# Patient Record
Sex: FEMALE | Race: WHITE | Employment: OTHER | URBAN - METROPOLITAN AREA
[De-identification: names, ages, dates, MRNs, and addresses within clinical notes are randomized per-mention and may not be internally consistent; named-entity substitution may affect disease eponyms.]

---

## 2024-10-04 ENCOUNTER — HOSPITAL ENCOUNTER (OUTPATIENT)
Dept: PREADMISSION TESTING | Age: 74
Discharge: HOME | End: 2024-10-08
Payer: MEDICARE

## 2024-10-04 VITALS
DIASTOLIC BLOOD PRESSURE: 68 MMHG | WEIGHT: 184 LBS | TEMPERATURE: 96.8 F | SYSTOLIC BLOOD PRESSURE: 165 MMHG | HEIGHT: 63 IN | RESPIRATION RATE: 14 BRPM | OXYGEN SATURATION: 97 % | HEART RATE: 81 BPM | BODY MASS INDEX: 32.6 KG/M2

## 2024-10-04 LAB
ANION GAP SERPL CALCULATED.3IONS-SCNC: 10 MMOL/L (ref 9–17)
BUN SERPL-MCNC: 16 MG/DL (ref 8–23)
BUN/CREAT SERPL: 18 (ref 9–20)
CALCIUM SERPL-MCNC: 9.9 MG/DL (ref 8.6–10.4)
CHLORIDE SERPL-SCNC: 99 MMOL/L (ref 98–107)
CO2 SERPL-SCNC: 27 MMOL/L (ref 20–31)
CREAT SERPL-MCNC: 0.9 MG/DL (ref 0.5–0.9)
ERYTHROCYTE [DISTWIDTH] IN BLOOD BY AUTOMATED COUNT: 14.2 % (ref 11.8–14.4)
GFR, ESTIMATED: 67 ML/MIN/1.73M2
GLUCOSE SERPL-MCNC: 93 MG/DL (ref 70–99)
HCT VFR BLD AUTO: 36.9 % (ref 36.3–47.1)
HGB BLD-MCNC: 12.3 G/DL (ref 11.9–15.1)
MCH RBC QN AUTO: 32.4 PG (ref 25.2–33.5)
MCHC RBC AUTO-ENTMCNC: 33.3 G/DL (ref 28.4–34.8)
MCV RBC AUTO: 97.1 FL (ref 82.6–102.9)
NRBC BLD-RTO: 0 PER 100 WBC
PLATELET # BLD AUTO: 247 K/UL (ref 138–453)
PMV BLD AUTO: 9.3 FL (ref 8.1–13.5)
POTASSIUM SERPL-SCNC: 4.3 MMOL/L (ref 3.7–5.3)
RBC # BLD AUTO: 3.8 M/UL (ref 3.95–5.11)
SODIUM SERPL-SCNC: 136 MMOL/L (ref 135–144)
WBC OTHER # BLD: 6.3 K/UL (ref 3.5–11.3)

## 2024-10-04 PROCEDURE — 36415 COLL VENOUS BLD VENIPUNCTURE: CPT

## 2024-10-04 PROCEDURE — 85027 COMPLETE CBC AUTOMATED: CPT

## 2024-10-04 PROCEDURE — 80048 BASIC METABOLIC PNL TOTAL CA: CPT

## 2024-10-04 RX ORDER — AMLODIPINE BESYLATE 10 MG/1
10 TABLET ORAL DAILY
COMMUNITY
Start: 2024-05-15

## 2024-10-04 RX ORDER — OMEGA-3S/DHA/EPA/FISH OIL/D3 300MG-1000
400 CAPSULE ORAL DAILY
COMMUNITY

## 2024-10-04 RX ORDER — ALLOPURINOL 100 MG/1
50 TABLET ORAL DAILY
COMMUNITY
Start: 2023-10-16

## 2024-10-04 RX ORDER — LISINOPRIL 40 MG/1
20 TABLET ORAL DAILY
COMMUNITY
Start: 2024-01-30

## 2024-10-04 RX ORDER — ALENDRONATE SODIUM 70 MG/1
70 TABLET ORAL
COMMUNITY
Start: 2024-04-08

## 2024-10-04 RX ORDER — POTASSIUM CHLORIDE 750 MG/1
10 TABLET, EXTENDED RELEASE ORAL DAILY
COMMUNITY
Start: 2024-09-06 | End: 2024-10-06

## 2024-10-04 RX ORDER — CYCLOBENZAPRINE HCL 10 MG
10 TABLET ORAL 3 TIMES DAILY PRN
COMMUNITY
Start: 2024-01-30

## 2024-10-04 RX ORDER — PRAVASTATIN SODIUM 80 MG/1
80 TABLET ORAL DAILY
COMMUNITY

## 2024-10-04 RX ORDER — CHLORAL HYDRATE 500 MG
1 CAPSULE ORAL DAILY
COMMUNITY

## 2024-10-04 RX ORDER — VITAMIN B COMPLEX
1 CAPSULE ORAL DAILY
COMMUNITY

## 2024-10-04 RX ORDER — CLONIDINE 0.2 MG/24H
1 PATCH, EXTENDED RELEASE TRANSDERMAL WEEKLY
COMMUNITY
Start: 2024-04-22

## 2024-10-04 RX ORDER — CETIRIZINE HYDROCHLORIDE 10 MG/1
10 TABLET ORAL DAILY
COMMUNITY

## 2024-10-04 RX ORDER — UBIDECARENONE 30 MG
100 CAPSULE ORAL DAILY
COMMUNITY

## 2024-10-04 RX ORDER — BIOTIN 10000 MCG
1 CAPSULE ORAL DAILY
COMMUNITY

## 2024-10-04 RX ORDER — DOXAZOSIN 2 MG/1
2 TABLET ORAL NIGHTLY
COMMUNITY
Start: 2024-08-30

## 2024-10-04 NOTE — H&P
for increase in thirst, increase in urination, and heat or cold intolerance.  MUSCULOSKELETAL: See HPI   NEUROLOGICAL: Negative for headaches, dizziness, lightheadedness, numbness, and tingling extremities.  BEHAVIOR/PSYCH: Negative for depression and anxiety.    Physical Exam:   BP (!) 165/68   Pulse 81   Temp 96.8 °F (36 °C) (Infrared)   Resp 14   Ht 1.6 m (5' 3\")   Wt 83.5 kg (184 lb)   SpO2 97%   BMI 32.59 kg/m²   No LMP recorded. Patient is postmenopausal.  No obstetric history on file.  No results for input(s): \"POCGLU\" in the last 72 hours.     General Appearance:  Obese Alert, well appearing, and in no acute distress.   Mental status: Oriented to person, place, and time.  Head: Normocephalic and atraumatic.  Eye: Wearing glasses No icterus, redness, pupils equal and reactive, extraocular eye movements intact, and conjunctiva clear.  Ear: Hearing grossly intact.  Nose: No drainage noted.  Mouth: Mucous membranes moist.  Neck: Supple and no carotid bruits noted.  Lungs: Bilateral equal air entry, clear to auscultation, no wheezing, rales or rhonchi, and normal effort.  Cardiovascular: Wearing event monitor. Normal rate, regular rhythm, no murmur, gallop, or rub.  Abdomen: Soft, nontender, nondistended, and active bowel sounds.  Neurologic: Normal speech and cranial nerves II through XII grossly intact. Strength 5/5 bilaterally.  Skin: No gross lesions, rashes, bruising, or bleeding on exposed skin area.  Extremities: Posterior tibial pulses 2+ bilaterally. No pedal edema.  No calf tenderness with palpation. Dorsiflexion and plantar flexion 5/5 bilaterally.   Psych: Normal affect.     Investigations:      Laboratory Testing:  Recent Results (from the past 24 hour(s))   Basic Metabolic Panel    Collection Time: 10/04/24 11:15 AM   Result Value Ref Range    Sodium 136 135 - 144 mmol/L    Potassium 4.3 3.7 - 5.3 mmol/L    Chloride 99 98 - 107 mmol/L    CO2 27 20 - 31 mmol/L    Anion Gap 10 9 - 17 mmol/L

## 2024-10-04 NOTE — PRE-PROCEDURE INSTRUCTIONS
On the Day of Your Surgery, Friday, 10/25/24, Please Arrive At 0545 AM     Enter the hospital through the Main Entrance, take the lobby elevators to the second floor and check in at the Surgery Registration desk.    You may bring a copy of living will and/or POA paperwork to registration on the day of surgery to be scanned.     Continue to take your home medications as you normally do up to and including the night before surgery with the exception of blood thinning medications.    Blood Thinning Medications:  Please stop prescription blood thinning medications such as Apixaban (Eliquis); Clopidogrel (Plavix); Dabigatran (Pradaxa); Prasugrel (Effient); Rivaroxaban (Xarelto); Ticagrelor (Brilinta); Warfarin (Coumadin) only as directed by your surgeon and/or the prescribing physician    Some common examples of other medications that can thin your blood are: Aspirin, Ibuprofen (Advil, Motrin), Naproxen (Aleve), Meloxicam (Mobic), Celecoxib (Celebrex), Fish Oil, many Herbal Supplements.  These medications should usually be stopped at least 7 days prior to surgery.    Co Q10, fish oil, biotin.    Tylenol is OK to take for pain the week prior to surgery.    Failure to stop certain medications may interfere with your scheduled surgery.    If you receive instructions from your surgeon regarding what medications to stop prior to surgery, please follow those specific instructions.    Please take the following medication(s) the day of surgery with small sips of water:              Doxazosin, amlodipine.    If you are on medicare and there is a possibility that you will be admitted following surgery:   Please bring your prescription medications in their original bottles with pharmacy label on the day of surgery.    Showering Before Surgery:     You can play an important role in your own health by carefully washing before surgery. Shower the night before and the morning of surgery using the instructions below. If you are allergic  listed above.  No smoking or chewing tobacco after midnight.  No alcoholic beverages for 24 hours prior to surgery.  2. You may brush your teeth but do not swallow the water.  3. If you wear glasses bring a case for them if you have one.  No contacts should be worn the day of surgery.  You may also bring your hearing aids. If you have dentures, most surgical procedures involving anesthesia will require that you remove them prior to surgery.  4. If you sleep with a CPAP or BiPAP machine at home and plan on staying in the hospital overnight after surgery, please bring your machine with you.   5. Do not wear any jewelry or body piercings the day of surgery.  No nail polish on the operative extremity (arm/hand or leg/foot surgeries)   6. If you are staying overnight with us, you may bring a small bag of necessary personal items.   7. Please wear loose, comfortable clothing.  If you are potentially going to have a cast, sling, brace or bulky dressing, make sure to wear clothing that will fit over it.   8. In case of illness - If you have cold or flu like symptoms (high fever, runny nose, sore throat, cough, etc.) rash, nausea, vomiting, loose stools, and/or recent contact with someone who has a contagious disease (chicken pox, measles, COVID-19, etc.).  Please call your surgeon before coming to the hospital.    Transportation After Your Surgery/Procedure:     If you are going home the same day of surgery you need someone to drive you home.  Your  must be at least 18 years of age.  A taxi cab or other nonmedical public transportation is not acceptable unless you have someone to ride home in the vehicle with you.   For your safety, someone must remain with you for the first 24 hours after your surgery if you receive anesthesia or medication.  If you do not have someone to stay with you, your procedure may be cancelled.  As a patient at [facility] you can expect quality medical and nursing care that is

## 2024-10-07 ENCOUNTER — ANESTHESIA EVENT (OUTPATIENT)
Dept: OPERATING ROOM | Age: 74
End: 2024-10-07
Payer: MEDICARE

## 2024-10-25 ENCOUNTER — ANESTHESIA (OUTPATIENT)
Dept: OPERATING ROOM | Age: 74
End: 2024-10-25
Payer: MEDICARE

## 2024-10-25 ENCOUNTER — HOSPITAL ENCOUNTER (INPATIENT)
Age: 74
LOS: 3 days | Discharge: NURSING, REHAB OR LONG-TERM CARE | DRG: 493 | End: 2024-10-28
Attending: PODIATRIST | Admitting: PODIATRIST
Payer: MEDICARE

## 2024-10-25 ENCOUNTER — APPOINTMENT (OUTPATIENT)
Dept: GENERAL RADIOLOGY | Age: 74
DRG: 493 | End: 2024-10-25
Attending: PODIATRIST
Payer: MEDICARE

## 2024-10-25 DIAGNOSIS — G89.18 POST-OP PAIN: Primary | ICD-10-CM

## 2024-10-25 LAB
GLUCOSE BLD-MCNC: 137 MG/DL (ref 65–105)
GLUCOSE BLD-MCNC: 206 MG/DL (ref 65–105)
GLUCOSE BLD-MCNC: 240 MG/DL (ref 65–105)
HCT VFR BLD AUTO: 35.7 % (ref 36.3–47.1)
HGB BLD-MCNC: 11.1 G/DL (ref 11.9–15.1)

## 2024-10-25 PROCEDURE — 73650 X-RAY EXAM OF HEEL: CPT

## 2024-10-25 PROCEDURE — 64447 NJX AA&/STRD FEMORAL NRV IMG: CPT | Performed by: ANESTHESIOLOGY

## 2024-10-25 PROCEDURE — 6370000000 HC RX 637 (ALT 250 FOR IP)

## 2024-10-25 PROCEDURE — 2580000003 HC RX 258: Performed by: NURSE ANESTHETIST, CERTIFIED REGISTERED

## 2024-10-25 PROCEDURE — 36415 COLL VENOUS BLD VENIPUNCTURE: CPT

## 2024-10-25 PROCEDURE — 3600000002 HC SURGERY LEVEL 2 BASE: Performed by: PODIATRIST

## 2024-10-25 PROCEDURE — 6360000002 HC RX W HCPCS: Performed by: ANESTHESIOLOGY

## 2024-10-25 PROCEDURE — 73630 X-RAY EXAM OF FOOT: CPT

## 2024-10-25 PROCEDURE — 7100000001 HC PACU RECOVERY - ADDTL 15 MIN: Performed by: PODIATRIST

## 2024-10-25 PROCEDURE — 2709999900 HC NON-CHARGEABLE SUPPLY: Performed by: PODIATRIST

## 2024-10-25 PROCEDURE — 0SGJ07Z FUSION OF LEFT TARSAL JOINT WITH AUTOLOGOUS TISSUE SUBSTITUTE, OPEN APPROACH: ICD-10-PCS | Performed by: PODIATRIST

## 2024-10-25 PROCEDURE — 73610 X-RAY EXAM OF ANKLE: CPT

## 2024-10-25 PROCEDURE — 85014 HEMATOCRIT: CPT

## 2024-10-25 PROCEDURE — 1200000000 HC SEMI PRIVATE

## 2024-10-25 PROCEDURE — 2500000003 HC RX 250 WO HCPCS: Performed by: PODIATRIST

## 2024-10-25 PROCEDURE — 73590 X-RAY EXAM OF LOWER LEG: CPT

## 2024-10-25 PROCEDURE — 6360000002 HC RX W HCPCS

## 2024-10-25 PROCEDURE — 2580000003 HC RX 258: Performed by: ANESTHESIOLOGY

## 2024-10-25 PROCEDURE — 6370000000 HC RX 637 (ALT 250 FOR IP): Performed by: INTERNAL MEDICINE

## 2024-10-25 PROCEDURE — C1713 ANCHOR/SCREW BN/BN,TIS/BN: HCPCS | Performed by: PODIATRIST

## 2024-10-25 PROCEDURE — 3600000012 HC SURGERY LEVEL 2 ADDTL 15MIN: Performed by: PODIATRIST

## 2024-10-25 PROCEDURE — 82947 ASSAY GLUCOSE BLOOD QUANT: CPT

## 2024-10-25 PROCEDURE — 2500000003 HC RX 250 WO HCPCS: Performed by: NURSE ANESTHETIST, CERTIFIED REGISTERED

## 2024-10-25 PROCEDURE — 6360000002 HC RX W HCPCS: Performed by: PODIATRIST

## 2024-10-25 PROCEDURE — 99222 1ST HOSP IP/OBS MODERATE 55: CPT | Performed by: INTERNAL MEDICINE

## 2024-10-25 PROCEDURE — 85018 HEMOGLOBIN: CPT

## 2024-10-25 PROCEDURE — 7100000000 HC PACU RECOVERY - FIRST 15 MIN: Performed by: PODIATRIST

## 2024-10-25 PROCEDURE — 3700000001 HC ADD 15 MINUTES (ANESTHESIA): Performed by: PODIATRIST

## 2024-10-25 PROCEDURE — 2720000010 HC SURG SUPPLY STERILE: Performed by: PODIATRIST

## 2024-10-25 PROCEDURE — 0SGG0KZ FUSION OF LEFT ANKLE JOINT WITH NONAUTOLOGOUS TISSUE SUBSTITUTE, OPEN APPROACH: ICD-10-PCS | Performed by: PODIATRIST

## 2024-10-25 PROCEDURE — 3700000000 HC ANESTHESIA ATTENDED CARE: Performed by: PODIATRIST

## 2024-10-25 PROCEDURE — 2580000003 HC RX 258

## 2024-10-25 PROCEDURE — 6360000002 HC RX W HCPCS: Performed by: NURSE ANESTHETIST, CERTIFIED REGISTERED

## 2024-10-25 PROCEDURE — 3E0T3BZ INTRODUCTION OF ANESTHETIC AGENT INTO PERIPHERAL NERVES AND PLEXI, PERCUTANEOUS APPROACH: ICD-10-PCS | Performed by: ANESTHESIOLOGY

## 2024-10-25 DEVICE — 3.5 X 28 MM R3CON NON-LOCKING PLATE SCREW
Type: IMPLANTABLE DEVICE | Site: FOOT | Status: FUNCTIONAL
Brand: GORILLA PLATING SYSTEM

## 2024-10-25 DEVICE — 3.5 X 24 MM R3CON NON-LOCKING PLATE SCREW
Type: IMPLANTABLE DEVICE | Site: FOOT | Status: FUNCTIONAL
Brand: GORILLA PLATING SYSTEM

## 2024-10-25 DEVICE — MONSTER 5.5 X 44MM HEADLESS SCREW, SHORT THREAD
Type: IMPLANTABLE DEVICE | Site: FOOT | Status: FUNCTIONAL
Brand: MONSTER SCREW SYSTEM

## 2024-10-25 DEVICE — 3.5 X 16 MM R3CON LOCKING PLATE SCREW
Type: IMPLANTABLE DEVICE | Site: FOOT | Status: FUNCTIONAL
Brand: GORILLA PLATING SYSTEM

## 2024-10-25 DEVICE — PHANTOM NAIL, CROSSING SCREW, Ø5.0MM X 26MM LENGTH, HEADED
Type: IMPLANTABLE DEVICE | Site: FOOT | Status: FUNCTIONAL
Brand: PHANTOM HINDFOOT TTC/TC NAIL SYSTEM

## 2024-10-25 DEVICE — GORILLA, PLATE, MED COL ARCH NC-1ST MET, 2.0MM THK, RIGHT, LG, TIA
Type: IMPLANTABLE DEVICE | Site: FOOT | Status: FUNCTIONAL
Brand: GORILLA PLATING SYSTEM

## 2024-10-25 DEVICE — PHANTOM NAIL, CALCANEUS SCREW, Ø7.2MM X 70MM LENGTH, HEADLESS
Type: IMPLANTABLE DEVICE | Site: HEEL | Status: FUNCTIONAL
Brand: PHANTOM HINDFOOT TTC/TC NAIL SYSTEM

## 2024-10-25 DEVICE — 3.5 X 24 MM R3CON LOCKING PLATE SCREW
Type: IMPLANTABLE DEVICE | Site: FOOT | Status: FUNCTIONAL
Brand: GORILLA PLATING SYSTEM

## 2024-10-25 DEVICE — PHANTOM ACTIVCORE NAIL, 10.0 X 250MM STERILE PACKAGED
Type: IMPLANTABLE DEVICE | Site: ANKLE | Status: FUNCTIONAL
Brand: PHANTOM HINDFOOT TTC/TC NAIL SYSTEM

## 2024-10-25 DEVICE — 3.5 X 46 MM R3CON NON-LOCKING PLATE SCREW
Type: IMPLANTABLE DEVICE | Site: FOOT | Status: FUNCTIONAL
Brand: GORILLA PLATING SYSTEM

## 2024-10-25 DEVICE — MONSTER 5.5 X 54MM HEADLESS SCREW, SHORT THREAD
Type: IMPLANTABLE DEVICE | Site: FOOT | Status: FUNCTIONAL
Brand: MONSTER SCREW SYSTEM

## 2024-10-25 DEVICE — GRAFT BNE 5 CC CELLULAR BNE MTRX V92 FC+: Type: IMPLANTABLE DEVICE | Site: ANKLE | Status: FUNCTIONAL

## 2024-10-25 DEVICE — IMPLANTABLE DEVICE: Type: IMPLANTABLE DEVICE | Site: HEEL | Status: FUNCTIONAL

## 2024-10-25 DEVICE — 3.5 X 18 MM R3CON LOCKING PLATE SCREW
Type: IMPLANTABLE DEVICE | Site: FOOT | Status: FUNCTIONAL
Brand: GORILLA PLATING SYSTEM

## 2024-10-25 RX ORDER — OXYCODONE HYDROCHLORIDE 5 MG/1
5 TABLET ORAL
Status: DISCONTINUED | OUTPATIENT
Start: 2024-10-25 | End: 2024-10-25 | Stop reason: HOSPADM

## 2024-10-25 RX ORDER — CEFAZOLIN SODIUM 1 G/3ML
INJECTION, POWDER, FOR SOLUTION INTRAMUSCULAR; INTRAVENOUS
Status: DISCONTINUED | OUTPATIENT
Start: 2024-10-25 | End: 2024-10-25 | Stop reason: SDUPTHER

## 2024-10-25 RX ORDER — SODIUM CHLORIDE 9 MG/ML
INJECTION, SOLUTION INTRAVENOUS PRN
Status: DISCONTINUED | OUTPATIENT
Start: 2024-10-25 | End: 2024-10-25 | Stop reason: HOSPADM

## 2024-10-25 RX ORDER — SODIUM CHLORIDE 0.9 % (FLUSH) 0.9 %
5-40 SYRINGE (ML) INJECTION EVERY 12 HOURS SCHEDULED
Status: DISCONTINUED | OUTPATIENT
Start: 2024-10-25 | End: 2024-10-28 | Stop reason: HOSPADM

## 2024-10-25 RX ORDER — VITAMIN B COMPLEX
1 CAPSULE ORAL DAILY
Status: DISCONTINUED | OUTPATIENT
Start: 2024-10-25 | End: 2024-10-25 | Stop reason: CLARIF

## 2024-10-25 RX ORDER — POTASSIUM CHLORIDE 1500 MG/1
40 TABLET, EXTENDED RELEASE ORAL PRN
Status: ACTIVE | OUTPATIENT
Start: 2024-10-25 | End: 2024-10-28

## 2024-10-25 RX ORDER — DIPHENHYDRAMINE HYDROCHLORIDE 50 MG/ML
12.5 INJECTION INTRAMUSCULAR; INTRAVENOUS
Status: DISCONTINUED | OUTPATIENT
Start: 2024-10-25 | End: 2024-10-25 | Stop reason: HOSPADM

## 2024-10-25 RX ORDER — ROPIVACAINE HYDROCHLORIDE 5 MG/ML
INJECTION, SOLUTION EPIDURAL; INFILTRATION; PERINEURAL
Status: COMPLETED | OUTPATIENT
Start: 2024-10-25 | End: 2024-10-25

## 2024-10-25 RX ORDER — EPHEDRINE SULFATE/0.9% NACL/PF 50 MG/5 ML
SYRINGE (ML) INTRAVENOUS
Status: DISCONTINUED | OUTPATIENT
Start: 2024-10-25 | End: 2024-10-25

## 2024-10-25 RX ORDER — UBIDECARENONE 30 MG
100 CAPSULE ORAL DAILY
Status: DISCONTINUED | OUTPATIENT
Start: 2024-10-25 | End: 2024-10-25 | Stop reason: CLARIF

## 2024-10-25 RX ORDER — FENTANYL CITRATE 50 UG/ML
50 INJECTION, SOLUTION INTRAMUSCULAR; INTRAVENOUS EVERY 5 MIN PRN
Status: DISCONTINUED | OUTPATIENT
Start: 2024-10-25 | End: 2024-10-25 | Stop reason: HOSPADM

## 2024-10-25 RX ORDER — EPHEDRINE SULFATE/0.9% NACL/PF 25 MG/5 ML
SYRINGE (ML) INTRAVENOUS
Status: DISCONTINUED | OUTPATIENT
Start: 2024-10-25 | End: 2024-10-25 | Stop reason: SDUPTHER

## 2024-10-25 RX ORDER — VITAMIN B COMPLEX
500 TABLET ORAL DAILY
Status: DISCONTINUED | OUTPATIENT
Start: 2024-10-26 | End: 2024-10-28 | Stop reason: HOSPADM

## 2024-10-25 RX ORDER — SODIUM CHLORIDE 9 MG/ML
INJECTION, SOLUTION INTRAVENOUS
Status: DISCONTINUED | OUTPATIENT
Start: 2024-10-25 | End: 2024-10-25 | Stop reason: SDUPTHER

## 2024-10-25 RX ORDER — CETIRIZINE HYDROCHLORIDE 10 MG/1
10 TABLET ORAL DAILY
Status: DISCONTINUED | OUTPATIENT
Start: 2024-10-25 | End: 2024-10-28 | Stop reason: HOSPADM

## 2024-10-25 RX ORDER — POLYETHYLENE GLYCOL 3350 17 G/17G
17 POWDER, FOR SOLUTION ORAL DAILY PRN
Status: DISCONTINUED | OUTPATIENT
Start: 2024-10-25 | End: 2024-10-28 | Stop reason: HOSPADM

## 2024-10-25 RX ORDER — ALLOPURINOL 100 MG/1
50 TABLET ORAL DAILY
Status: DISCONTINUED | OUTPATIENT
Start: 2024-10-25 | End: 2024-10-28 | Stop reason: HOSPADM

## 2024-10-25 RX ORDER — PROCHLORPERAZINE EDISYLATE 5 MG/ML
5 INJECTION INTRAMUSCULAR; INTRAVENOUS
Status: DISCONTINUED | OUTPATIENT
Start: 2024-10-25 | End: 2024-10-25 | Stop reason: HOSPADM

## 2024-10-25 RX ORDER — POTASSIUM CHLORIDE 1500 MG/1
10 TABLET, EXTENDED RELEASE ORAL DAILY
Status: DISCONTINUED | OUTPATIENT
Start: 2024-10-26 | End: 2024-10-28 | Stop reason: HOSPADM

## 2024-10-25 RX ORDER — HYDROMORPHONE HYDROCHLORIDE 1 MG/ML
0.5 INJECTION, SOLUTION INTRAMUSCULAR; INTRAVENOUS; SUBCUTANEOUS EVERY 4 HOURS PRN
Status: DISCONTINUED | OUTPATIENT
Start: 2024-10-25 | End: 2024-10-28 | Stop reason: HOSPADM

## 2024-10-25 RX ORDER — SODIUM CHLORIDE 0.9 % (FLUSH) 0.9 %
5-40 SYRINGE (ML) INJECTION PRN
Status: DISCONTINUED | OUTPATIENT
Start: 2024-10-25 | End: 2024-10-28 | Stop reason: HOSPADM

## 2024-10-25 RX ORDER — FENTANYL CITRATE 50 UG/ML
INJECTION, SOLUTION INTRAMUSCULAR; INTRAVENOUS
Status: DISCONTINUED | OUTPATIENT
Start: 2024-10-25 | End: 2024-10-25 | Stop reason: SDUPTHER

## 2024-10-25 RX ORDER — PRAVASTATIN SODIUM 40 MG
80 TABLET ORAL DAILY
Status: DISCONTINUED | OUTPATIENT
Start: 2024-10-25 | End: 2024-10-28 | Stop reason: HOSPADM

## 2024-10-25 RX ORDER — ONDANSETRON 2 MG/ML
4 INJECTION INTRAMUSCULAR; INTRAVENOUS EVERY 6 HOURS PRN
Status: DISCONTINUED | OUTPATIENT
Start: 2024-10-25 | End: 2024-10-28 | Stop reason: HOSPADM

## 2024-10-25 RX ORDER — LANOLIN ALCOHOL/MO/W.PET/CERES
400 CREAM (GRAM) TOPICAL DAILY
Status: DISCONTINUED | OUTPATIENT
Start: 2024-10-26 | End: 2024-10-28 | Stop reason: HOSPADM

## 2024-10-25 RX ORDER — METOPROLOL TARTRATE 1 MG/ML
5 INJECTION, SOLUTION INTRAVENOUS EVERY 6 HOURS PRN
Status: DISCONTINUED | OUTPATIENT
Start: 2024-10-25 | End: 2024-10-28 | Stop reason: HOSPADM

## 2024-10-25 RX ORDER — ENOXAPARIN SODIUM 100 MG/ML
40 INJECTION SUBCUTANEOUS EVERY 24 HOURS
Status: DISCONTINUED | OUTPATIENT
Start: 2024-10-25 | End: 2024-10-28 | Stop reason: HOSPADM

## 2024-10-25 RX ORDER — ROCURONIUM BROMIDE 10 MG/ML
INJECTION, SOLUTION INTRAVENOUS
Status: DISCONTINUED | OUTPATIENT
Start: 2024-10-25 | End: 2024-10-25 | Stop reason: SDUPTHER

## 2024-10-25 RX ORDER — SODIUM CHLORIDE 0.9 % (FLUSH) 0.9 %
5-40 SYRINGE (ML) INJECTION PRN
Status: DISCONTINUED | OUTPATIENT
Start: 2024-10-25 | End: 2024-10-25 | Stop reason: HOSPADM

## 2024-10-25 RX ORDER — LIDOCAINE HYDROCHLORIDE 20 MG/ML
INJECTION, SOLUTION EPIDURAL; INFILTRATION; INTRACAUDAL; PERINEURAL
Status: DISCONTINUED | OUTPATIENT
Start: 2024-10-25 | End: 2024-10-25 | Stop reason: SDUPTHER

## 2024-10-25 RX ORDER — DEXAMETHASONE SODIUM PHOSPHATE 10 MG/ML
INJECTION, SOLUTION INTRAMUSCULAR; INTRAVENOUS
Status: DISCONTINUED | OUTPATIENT
Start: 2024-10-25 | End: 2024-10-25 | Stop reason: SDUPTHER

## 2024-10-25 RX ORDER — DOXAZOSIN 1 MG/1
2 TABLET ORAL NIGHTLY
Status: DISCONTINUED | OUTPATIENT
Start: 2024-10-25 | End: 2024-10-28 | Stop reason: HOSPADM

## 2024-10-25 RX ORDER — SODIUM CHLORIDE 9 MG/ML
INJECTION, SOLUTION INTRAVENOUS PRN
Status: DISCONTINUED | OUTPATIENT
Start: 2024-10-25 | End: 2024-10-28 | Stop reason: HOSPADM

## 2024-10-25 RX ORDER — DEXTROSE, SODIUM CHLORIDE, SODIUM LACTATE, POTASSIUM CHLORIDE, AND CALCIUM CHLORIDE 5; .6; .31; .03; .02 G/100ML; G/100ML; G/100ML; G/100ML; G/100ML
INJECTION, SOLUTION INTRAVENOUS CONTINUOUS
Status: DISCONTINUED | OUTPATIENT
Start: 2024-10-25 | End: 2024-10-27

## 2024-10-25 RX ORDER — CALCIUM CARBONATE 500 MG/1
500 TABLET, CHEWABLE ORAL 3 TIMES DAILY PRN
Status: DISCONTINUED | OUTPATIENT
Start: 2024-10-25 | End: 2024-10-27

## 2024-10-25 RX ORDER — PHENYLEPHRINE HCL IN 0.9% NACL 1 MG/10 ML
SYRINGE (ML) INTRAVENOUS
Status: DISCONTINUED | OUTPATIENT
Start: 2024-10-25 | End: 2024-10-25 | Stop reason: SDUPTHER

## 2024-10-25 RX ORDER — MIDAZOLAM HYDROCHLORIDE 1 MG/ML
2 INJECTION, SOLUTION INTRAMUSCULAR; INTRAVENOUS ONCE
Status: COMPLETED | OUTPATIENT
Start: 2024-10-25 | End: 2024-10-25

## 2024-10-25 RX ORDER — MAGNESIUM SULFATE IN WATER 40 MG/ML
2000 INJECTION, SOLUTION INTRAVENOUS PRN
Status: DISCONTINUED | OUTPATIENT
Start: 2024-10-25 | End: 2024-10-28 | Stop reason: HOSPADM

## 2024-10-25 RX ORDER — LIDOCAINE HYDROCHLORIDE 10 MG/ML
1 INJECTION, SOLUTION EPIDURAL; INFILTRATION; INTRACAUDAL; PERINEURAL
Status: DISCONTINUED | OUTPATIENT
Start: 2024-10-26 | End: 2024-10-25 | Stop reason: HOSPADM

## 2024-10-25 RX ORDER — LANOLIN ALCOHOL/MO/W.PET/CERES
3 CREAM (GRAM) TOPICAL NIGHTLY
Status: DISCONTINUED | OUTPATIENT
Start: 2024-10-25 | End: 2024-10-28 | Stop reason: HOSPADM

## 2024-10-25 RX ORDER — PROPOFOL 10 MG/ML
INJECTION, EMULSION INTRAVENOUS
Status: DISCONTINUED | OUTPATIENT
Start: 2024-10-25 | End: 2024-10-25 | Stop reason: SDUPTHER

## 2024-10-25 RX ORDER — OXYCODONE AND ACETAMINOPHEN 5; 325 MG/1; MG/1
1 TABLET ORAL EVERY 4 HOURS PRN
Status: DISCONTINUED | OUTPATIENT
Start: 2024-10-25 | End: 2024-10-28 | Stop reason: HOSPADM

## 2024-10-25 RX ORDER — BISACODYL 10 MG
10 SUPPOSITORY, RECTAL RECTAL DAILY PRN
Status: DISCONTINUED | OUTPATIENT
Start: 2024-10-25 | End: 2024-10-28 | Stop reason: HOSPADM

## 2024-10-25 RX ORDER — CYCLOBENZAPRINE HCL 10 MG
10 TABLET ORAL 3 TIMES DAILY PRN
Status: DISCONTINUED | OUTPATIENT
Start: 2024-10-25 | End: 2024-10-28 | Stop reason: HOSPADM

## 2024-10-25 RX ORDER — AMLODIPINE BESYLATE 10 MG/1
10 TABLET ORAL DAILY
Status: DISCONTINUED | OUTPATIENT
Start: 2024-10-26 | End: 2024-10-28 | Stop reason: HOSPADM

## 2024-10-25 RX ORDER — CLONIDINE 0.2 MG/24H
1 PATCH, EXTENDED RELEASE TRANSDERMAL WEEKLY
Status: DISCONTINUED | OUTPATIENT
Start: 2024-10-25 | End: 2024-10-28 | Stop reason: HOSPADM

## 2024-10-25 RX ORDER — BIOTIN 10000 MCG
1 CAPSULE ORAL DAILY
Status: DISCONTINUED | OUTPATIENT
Start: 2024-10-25 | End: 2024-10-25 | Stop reason: CLARIF

## 2024-10-25 RX ORDER — CHLORAL HYDRATE 500 MG
1 CAPSULE ORAL DAILY
Status: DISCONTINUED | OUTPATIENT
Start: 2024-10-25 | End: 2024-10-25 | Stop reason: CLARIF

## 2024-10-25 RX ORDER — VASOPRESSIN 20 [USP'U]/ML
INJECTION, SOLUTION INTRAMUSCULAR; SUBCUTANEOUS
Status: DISCONTINUED | OUTPATIENT
Start: 2024-10-25 | End: 2024-10-25 | Stop reason: SDUPTHER

## 2024-10-25 RX ORDER — SODIUM CHLORIDE, SODIUM LACTATE, POTASSIUM CHLORIDE, CALCIUM CHLORIDE 600; 310; 30; 20 MG/100ML; MG/100ML; MG/100ML; MG/100ML
INJECTION, SOLUTION INTRAVENOUS CONTINUOUS
Status: DISCONTINUED | OUTPATIENT
Start: 2024-10-25 | End: 2024-10-25 | Stop reason: HOSPADM

## 2024-10-25 RX ORDER — ONDANSETRON 2 MG/ML
INJECTION INTRAMUSCULAR; INTRAVENOUS
Status: DISCONTINUED | OUTPATIENT
Start: 2024-10-25 | End: 2024-10-25 | Stop reason: SDUPTHER

## 2024-10-25 RX ORDER — SODIUM CHLORIDE 9 MG/ML
INJECTION, SOLUTION INTRAVENOUS CONTINUOUS
Status: DISCONTINUED | OUTPATIENT
Start: 2024-10-25 | End: 2024-10-25 | Stop reason: HOSPADM

## 2024-10-25 RX ORDER — ACETAMINOPHEN 325 MG/1
650 TABLET ORAL EVERY 6 HOURS PRN
Status: DISCONTINUED | OUTPATIENT
Start: 2024-10-25 | End: 2024-10-28 | Stop reason: HOSPADM

## 2024-10-25 RX ORDER — SODIUM CHLORIDE, SODIUM LACTATE, POTASSIUM CHLORIDE, CALCIUM CHLORIDE 600; 310; 30; 20 MG/100ML; MG/100ML; MG/100ML; MG/100ML
INJECTION, SOLUTION INTRAVENOUS
Status: DISCONTINUED | OUTPATIENT
Start: 2024-10-25 | End: 2024-10-25

## 2024-10-25 RX ORDER — ONDANSETRON 2 MG/ML
4 INJECTION INTRAMUSCULAR; INTRAVENOUS
Status: DISCONTINUED | OUTPATIENT
Start: 2024-10-25 | End: 2024-10-25 | Stop reason: HOSPADM

## 2024-10-25 RX ORDER — ONDANSETRON 4 MG/1
4 TABLET, ORALLY DISINTEGRATING ORAL EVERY 8 HOURS PRN
Status: DISCONTINUED | OUTPATIENT
Start: 2024-10-25 | End: 2024-10-28 | Stop reason: HOSPADM

## 2024-10-25 RX ORDER — SENNOSIDES A AND B 8.6 MG/1
2 TABLET, FILM COATED ORAL NIGHTLY PRN
Status: DISCONTINUED | OUTPATIENT
Start: 2024-10-25 | End: 2024-10-28 | Stop reason: HOSPADM

## 2024-10-25 RX ORDER — ACETAMINOPHEN 650 MG/1
650 SUPPOSITORY RECTAL EVERY 6 HOURS PRN
Status: DISCONTINUED | OUTPATIENT
Start: 2024-10-25 | End: 2024-10-28 | Stop reason: HOSPADM

## 2024-10-25 RX ORDER — LISINOPRIL 40 MG/1
40 TABLET ORAL DAILY
Status: DISCONTINUED | OUTPATIENT
Start: 2024-10-25 | End: 2024-10-28 | Stop reason: HOSPADM

## 2024-10-25 RX ORDER — FENTANYL CITRATE 50 UG/ML
25 INJECTION, SOLUTION INTRAMUSCULAR; INTRAVENOUS EVERY 5 MIN PRN
Status: DISCONTINUED | OUTPATIENT
Start: 2024-10-25 | End: 2024-10-25 | Stop reason: HOSPADM

## 2024-10-25 RX ORDER — SODIUM CHLORIDE 0.9 % (FLUSH) 0.9 %
5-40 SYRINGE (ML) INJECTION EVERY 12 HOURS SCHEDULED
Status: DISCONTINUED | OUTPATIENT
Start: 2024-10-25 | End: 2024-10-25 | Stop reason: HOSPADM

## 2024-10-25 RX ORDER — POTASSIUM CHLORIDE 7.45 MG/ML
10 INJECTION INTRAVENOUS PRN
Status: ACTIVE | OUTPATIENT
Start: 2024-10-25 | End: 2024-10-28

## 2024-10-25 RX ADMIN — Medication 500 MG: at 22:25

## 2024-10-25 RX ADMIN — EPHEDRINE SULFATE 15 MG: 5 INJECTION INTRAVENOUS at 08:21

## 2024-10-25 RX ADMIN — Medication 2000 MG: at 18:01

## 2024-10-25 RX ADMIN — ENOXAPARIN SODIUM 40 MG: 100 INJECTION SUBCUTANEOUS at 18:40

## 2024-10-25 RX ADMIN — VASOPRESSIN 2 UNITS: 20 INJECTION INTRAVENOUS at 11:26

## 2024-10-25 RX ADMIN — ALLOPURINOL 50 MG: 100 TABLET ORAL at 20:39

## 2024-10-25 RX ADMIN — PROPOFOL 100 MG: 10 INJECTION, EMULSION INTRAVENOUS at 07:31

## 2024-10-25 RX ADMIN — VASOPRESSIN 2 UNITS: 20 INJECTION INTRAVENOUS at 11:15

## 2024-10-25 RX ADMIN — SODIUM CHLORIDE, PRESERVATIVE FREE 10 ML: 5 INJECTION INTRAVENOUS at 20:41

## 2024-10-25 RX ADMIN — VASOPRESSIN 4 UNITS: 20 INJECTION INTRAVENOUS at 10:46

## 2024-10-25 RX ADMIN — VASOPRESSIN 2 UNITS: 20 INJECTION INTRAVENOUS at 11:11

## 2024-10-25 RX ADMIN — SODIUM CHLORIDE, SODIUM LACTATE, POTASSIUM CHLORIDE, CALCIUM CHLORIDE AND DEXTROSE MONOHYDRATE: 5; 600; 310; 30; 20 INJECTION, SOLUTION INTRAVENOUS at 06:30

## 2024-10-25 RX ADMIN — EPHEDRINE SULFATE 25 MG: 5 INJECTION INTRAVENOUS at 08:42

## 2024-10-25 RX ADMIN — ONDANSETRON 4 MG: 2 INJECTION, SOLUTION INTRAMUSCULAR; INTRAVENOUS at 07:40

## 2024-10-25 RX ADMIN — Medication 10 MG: at 09:43

## 2024-10-25 RX ADMIN — FENTANYL CITRATE 50 MCG: 50 INJECTION INTRAMUSCULAR; INTRAVENOUS at 11:03

## 2024-10-25 RX ADMIN — ROCURONIUM BROMIDE 30 MG: 10 INJECTION, SOLUTION INTRAVENOUS at 12:04

## 2024-10-25 RX ADMIN — Medication 10 MG: at 12:04

## 2024-10-25 RX ADMIN — Medication 30 MG: at 07:59

## 2024-10-25 RX ADMIN — Medication 300 MCG: at 08:32

## 2024-10-25 RX ADMIN — FENTANYL CITRATE 50 MCG: 50 INJECTION INTRAMUSCULAR; INTRAVENOUS at 07:45

## 2024-10-25 RX ADMIN — SODIUM CHLORIDE: 9 INJECTION, SOLUTION INTRAVENOUS at 13:20

## 2024-10-25 RX ADMIN — CEFAZOLIN 2 G: 1 INJECTION, POWDER, FOR SOLUTION INTRAMUSCULAR; INTRAVENOUS at 11:39

## 2024-10-25 RX ADMIN — SODIUM CHLORIDE, PRESERVATIVE FREE 10 ML: 5 INJECTION INTRAVENOUS at 18:01

## 2024-10-25 RX ADMIN — MIDAZOLAM 2 MG: 1 INJECTION INTRAMUSCULAR; INTRAVENOUS at 07:10

## 2024-10-25 RX ADMIN — SODIUM CHLORIDE: 9 INJECTION, SOLUTION INTRAVENOUS at 11:36

## 2024-10-25 RX ADMIN — LISINOPRIL 40 MG: 40 TABLET ORAL at 20:40

## 2024-10-25 RX ADMIN — DEXAMETHASONE SODIUM PHOSPHATE 10 MG: 10 INJECTION, SOLUTION INTRAMUSCULAR; INTRAVENOUS at 07:40

## 2024-10-25 RX ADMIN — CETIRIZINE HYDROCHLORIDE 10 MG: 10 TABLET, FILM COATED ORAL at 20:39

## 2024-10-25 RX ADMIN — DOXAZOSIN 2 MG: 1 TABLET ORAL at 20:40

## 2024-10-25 RX ADMIN — PROPOFOL 50 MG: 10 INJECTION, EMULSION INTRAVENOUS at 07:41

## 2024-10-25 RX ADMIN — VASOPRESSIN 2 UNITS: 20 INJECTION INTRAVENOUS at 10:30

## 2024-10-25 RX ADMIN — EPHEDRINE SULFATE 10 MG: 5 INJECTION INTRAVENOUS at 08:29

## 2024-10-25 RX ADMIN — VASOPRESSIN 4 UNITS: 20 INJECTION INTRAVENOUS at 13:33

## 2024-10-25 RX ADMIN — PRAVASTATIN SODIUM 80 MG: 40 TABLET ORAL at 20:40

## 2024-10-25 RX ADMIN — VASOPRESSIN 2 UNITS: 20 INJECTION INTRAVENOUS at 10:18

## 2024-10-25 RX ADMIN — FENTANYL CITRATE 25 MCG: 50 INJECTION INTRAMUSCULAR; INTRAVENOUS at 10:54

## 2024-10-25 RX ADMIN — FENTANYL CITRATE 25 MCG: 50 INJECTION INTRAMUSCULAR; INTRAVENOUS at 12:04

## 2024-10-25 RX ADMIN — FENTANYL CITRATE 25 MCG: 50 INJECTION INTRAMUSCULAR; INTRAVENOUS at 14:22

## 2024-10-25 RX ADMIN — FENTANYL CITRATE 25 MCG: 50 INJECTION INTRAMUSCULAR; INTRAVENOUS at 09:43

## 2024-10-25 RX ADMIN — LIDOCAINE HYDROCHLORIDE 30 MG: 20 INJECTION, SOLUTION EPIDURAL; INFILTRATION; INTRACAUDAL; PERINEURAL at 07:31

## 2024-10-25 RX ADMIN — Medication 200 MCG: at 08:05

## 2024-10-25 RX ADMIN — Medication 300 MCG: at 12:18

## 2024-10-25 RX ADMIN — Medication 2000 MG: at 07:46

## 2024-10-25 RX ADMIN — SODIUM CHLORIDE: 9 INJECTION, SOLUTION INTRAVENOUS at 07:50

## 2024-10-25 RX ADMIN — SUGAMMADEX 200 MG: 100 INJECTION, SOLUTION INTRAVENOUS at 14:24

## 2024-10-25 RX ADMIN — VASOPRESSIN 4 UNITS: 20 INJECTION INTRAVENOUS at 12:32

## 2024-10-25 RX ADMIN — ROPIVACAINE HYDROCHLORIDE 40 ML: 5 INJECTION, SOLUTION EPIDURAL; INFILTRATION; PERINEURAL at 07:10

## 2024-10-25 RX ADMIN — VASOPRESSIN 4 UNITS: 20 INJECTION INTRAVENOUS at 08:46

## 2024-10-25 RX ADMIN — Medication 200 MCG: at 08:38

## 2024-10-25 RX ADMIN — VASOPRESSIN 2 UNITS: 20 INJECTION INTRAVENOUS at 09:56

## 2024-10-25 RX ADMIN — ROCURONIUM BROMIDE 50 MG: 10 INJECTION, SOLUTION INTRAVENOUS at 07:31

## 2024-10-25 ASSESSMENT — ENCOUNTER SYMPTOMS: SHORTNESS OF BREATH: 0

## 2024-10-25 ASSESSMENT — PAIN SCALES - GENERAL
PAINLEVEL_OUTOF10: 0
PAINLEVEL_OUTOF10: 2
PAINLEVEL_OUTOF10: 2

## 2024-10-25 ASSESSMENT — PAIN DESCRIPTION - LOCATION
LOCATION: ELBOW
LOCATION: ARM

## 2024-10-25 ASSESSMENT — PAIN - FUNCTIONAL ASSESSMENT
PAIN_FUNCTIONAL_ASSESSMENT: NONE - DENIES PAIN
PAIN_FUNCTIONAL_ASSESSMENT: 0-10

## 2024-10-25 ASSESSMENT — PAIN DESCRIPTION - ORIENTATION
ORIENTATION: RIGHT;INNER
ORIENTATION: RIGHT

## 2024-10-25 ASSESSMENT — PAIN DESCRIPTION - DESCRIPTORS: DESCRIPTORS: SORE

## 2024-10-25 NOTE — ANESTHESIA PRE PROCEDURE
Department of Anesthesiology  Preprocedure Note       Name:  Shaila Souza   Age:  73 y.o.  :  1950                                          MRN:  [id number]         Date:  10/25/2024      Surgeon: Surgeon(s):  Jemma Clark DPM    Procedure: Procedure(s):  LEFT ANKLE  PANTALAR FUSION    Medications prior to admission:   Prior to Admission medications    Medication Sig Start Date End Date Taking? Authorizing Provider   allopurinol (ZYLOPRIM) 100 MG tablet Take 0.5 tablets by mouth daily 10/16/23  Yes Merle Oconnell MD   amLODIPine (NORVASC) 10 MG tablet Take 1 tablet by mouth daily 5/15/24  Yes Merle Oconnell MD   doxazosin (CARDURA) 2 MG tablet Take 1 tablet by mouth nightly 24  Yes Merle Oconnell MD   pravastatin (PRAVACHOL) 80 MG tablet Take 1 tablet by mouth daily   Yes Merle Oconnell MD   lisinopril (PRINIVIL;ZESTRIL) 40 MG tablet Take 0.5 tablets by mouth daily 24  Yes Merle Oconnell MD   cetirizine (ZYRTEC) 10 MG tablet Take 1 tablet by mouth daily   Yes Merle Oconnell MD   alendronate (FOSAMAX) 70 MG tablet Take 1 tablet by mouth every 7 days 24   Merle Oconnell MD   Biotin 10 MG CAPS Take 1 capsule by mouth daily    Merle Oconnell MD   cloNIDine (CATAPRES) 0.2 MG/24HR PTWK Place 1 patch onto the skin once a week 24   Merle Oconnell MD   cyclobenzaprine (FLEXERIL) 10 MG tablet Take 1 tablet by mouth 3 times daily as needed 24   Merle Oconnell MD   potassium chloride (KLOR-CON M) 10 MEQ extended release tablet Take 1 tablet by mouth daily 9/6/24 10/6/24  Merle Oconnell MD   b complex vitamins capsule Take 1 capsule by mouth daily    Merle Oconnell MD   coenzyme Q-10 100 MG capsule Take 1 capsule by mouth daily    Merle Oconnell MD   cholecalciferol (VITAMIN D3) 400 UNIT TABS tablet Take 1 tablet by mouth daily    Merle Oconnell MD   Omega-3 Fatty Acids (FISH OIL)

## 2024-10-25 NOTE — OP NOTE
bone graft were placed at the arthrodesis site.  Due to significant bone loss, deformity correction and softening of the talar medullary bone, we decided to apply a structural allograft between the talus and navicular bone.  This also helped recreate the arch.  2 K wires compatible with 5.5 millimeter screws were inserted for provisional fixation.  In following AO principles two 5.5 millimeter screws were inserted in a lag by technique fashion from the navicular and across the graft.  Each screw was retrograded.  There was excellent compression across the joint.  From here it was decided to use a bridge plate construct from the talus, across the allograft navicular, and into the medial cuneiform.  A [place] large arch plate was used.  A combination of locking and nonlocking 3.5 millimeter screws were applied.  This created a steroid construct from talus to medial cuneiform across the medial column.  Restoration of the arch was noted.     Once again multiple intraoperative fluoroscopic images were utilized to confirm appropriate reduction, alignment, and safe hardware placement.  There was noted to be excellent compression across the arthrodesis sites.    Final radiographs were obtained and reviewed, showing the hardware was positioned safely. The foot was again checked clinically, and the position of the fusion appeared appropriate.  Clinically the tibial crest was in line with the second ray.  Recreation of the arch was excellent.  The hindfoot was noted to be in a neutral alignment.  There was adequate compression across the fusion sites.  The foot and toes were all noted to be well perfused. The fixation was stable.      Copious sterile irrigation was used to rinse the operative sites, and a layered closure was performed using 2-0 vicryl and 3-0 nylon, providing appropriate tension to the skin. The skin was cleaned and gently dried.  Dressings consisted of Adaptic and fluffs, ABDs.  A sterile layer of cast

## 2024-10-25 NOTE — ANESTHESIA POSTPROCEDURE EVALUATION
Department of Anesthesiology  Postprocedure Note    Patient: Shaila Souza  MRN: [id number]  YOB: 1950  Date of evaluation: 10/25/2024    Procedure Summary       Date: 10/25/24 Room / Location: [address]    Anesthesia Start: 0727 Anesthesia Stop: 1443    Procedure: LEFT ANKLE  PANTALAR FUSION MEDIAL COLUMN FUSION GASTRONEMIUS RECESSION (Left) Diagnosis:       Valgus deformity, not elsewhere classified, left ankle      (Valgus deformity, not elsewhere classified, left ankle [M21.072])    Surgeons: Jemma Clark DPM Responsible Provider: Kathie Wahl MD    Anesthesia Type: General ASA Status: 2            Anesthesia Type: General    Aruna Phase I: Aruna Score: 10    Aruna Phase II:      Anesthesia Post Evaluation    Patient location during evaluation: PACU  Patient participation: complete - patient participated  Level of consciousness: awake  Airway patency: patent  Nausea & Vomiting: no nausea  Cardiovascular status: blood pressure returned to baseline  Respiratory status: acceptable  Hydration status: euvolemic  Comments: Multimodal analgesia pain management as indicated by procedure  Multimodal analgesia pain management approach  Pain management: adequate    No notable events documented.

## 2024-10-25 NOTE — CONSULTS
[facility]  Office: [phone]  Josias Ramos DO, Mika Umanzor DO, Kristopher Combs DO, Efe Tatum DO, Olinda Joseph MD, Phuong Myrick MD, Rashel Dominguez MD, Ninfa Camacho MD,  Jorge Alvarado MD, Kate Garsia MD, Zachary Jones MD,  Jaime Solano DO, Gorge Nelson MD, Rm Baugh MD, Robert Ramos DO, Mary Portillo MD,  Neel Sin DO, Betty Ragsdale MD, Amber Barboza MD, Salma Shah MD, Romario Garcia MD,  Denny Sloan MD, Luis Felipe Samayoa MD, Mandi Burns MD, Caden Antoine MD, Austin Lopez MD, Tran Mckeon MD, Lucas Finney DO, Cordell Garcia MD, Shirley Waterhouse, CNP,  Melissa Tavera, CNP, Lucas Tobin, CNP,  Laney Spaulding, MIKE, Chapis Brock, CNP, Naina Simons, CNP, Mercedes Meyers, CNP, Nedra Gonzalez, CNP, Kelin Hernandez PA-C, Tanika Singh PA-C, Jane Kim, CNP, Andi Zhang, CNP,  Carol Sanders, CNP, Haydee Vinson, CNP,  Bridget Mills, CNP, Carla Rea, CNP         [facility]   IN-PATIENT SERVICE   [facility]    CONSULTATION / HISTORY AND PHYSICAL EXAMINATION            Date:   10/25/2024  Patient name:  Shaila Souza  Date of admission:  10/25/2024  5:45 AM  MRN:   [id number]  Account:  [id number]  YOB: 1950  PCP:    Mercedes Johnson DO  Room:   2016/2016-02  Code Status:    Full Code    Physician Requesting Consult: Jemma Clark DPM    Reason for Consult: Postoperative medical management of hypertension    Chief Complaint:     Ankle arthritis    History Obtained From:     patient    History of Present Illness:     This is a 73-year-old female that presents with left ankle pain and ankle arthritis for plantar arthrodesis, fibular takedown osteotomy as well as arthrodesis of multiple midfoot joints.  She has had longstanding issues with arthritic complaints related to her ankle treated previously with steroid injections with decreasing effectiveness.  At this time she is opted for surgical

## 2024-10-25 NOTE — H&P
Admission History and Physical  Foot and Ankle Surgery     Subjective     Chief Complaint: Postoperative pain/ Facility placement    HPI:  Shaila Souza is a 73 y.o. female who underwent a pantalar arthrodesis and medial column arthrodesis of her left foot and ankle by our service on 10/25/2024.  Patient underwent an extensive procedure and is being admitted for postoperative pain and monitoring.  Does not have an extensive past medical history.  In addition patient is set to discharge to an acute rehab facility for recovery.  Patient is in a posterior splint and is nonweightbearing to the operated extremity.  She did receive a popliteal block to help control her pain.  She will be evaluated and worked with PT/OT while in house.    PCP is Mercedes Johnson DO    ROS:   Review of Systems   Constitutional: Negative.    HENT: Negative.     Eyes: Negative.    Respiratory: Negative.     Cardiovascular:  Positive for leg swelling.   Gastrointestinal: Negative.    Endocrine: Negative.    Genitourinary: Negative.    Musculoskeletal:  Positive for arthralgias, gait problem and joint swelling.   Skin: Negative.    Allergic/Immunologic: Negative.    Hematological: Negative.    Psychiatric/Behavioral: Negative.         Past Medical History   has a past medical history of Arthritis, GERD (gastroesophageal reflux disease), History of blood transfusion, Hyperlipidemia, Hypertension, and Under care of team.    Past Surgical History   has a past surgical history that includes Neck mass excision (Left); US BIOPSY THYROID; Tubal ligation; Total hip arthroplasty (Left); fracture surgery (Right); Excision basal cell carcinoma; and Colonoscopy.    Medications  Prior to Admission medications    Medication Sig Start Date End Date Taking? Authorizing Provider   allopurinol (ZYLOPRIM) 100 MG tablet Take 0.5 tablets by mouth daily 10/16/23  Yes Provider, MD Merle   amLODIPine (NORVASC) 10 MG tablet Take 1 tablet by mouth daily 
m/sec Pulmonic Valve 0.6 - 0.9 m/sec Regurgitation No Max Velocity 0.80 m/sec 1.15 m/s Max Gradient 5.00 mmHg Findings Left Ventricle: The left ventricle appears normal in size. Global left ventricular systolic function is normal. Left ventricular wall thickness is normal. No regional wall motion abnormality. Unable to assess diastolic dysfunction. Right Ventricle: The right ventricle appears normal in size. Right ventricular systolic function appears normal. Left Atrium: The left atrium appears normal in size. IAS: No intracardiac shunt by agitated saline injections. Right Atrium: The right atrium appears normal in size. Mitral Valve: Mitral valve appears normal. No mitral regurgitation. Aortic Valve: Aortic valve is poorly visualized. Tricuspid Valve: Tricuspid valve appears normal. Pulmonic Valve: Pulmonic valve is poorly visualized. Aorta: The aortic root is not well visualized. Great Vessels: IVC: The IVC is dilated. The patient is unable to perform sniff test of the IVC. Pericardium: There is a small anterior pericardial effusion. Procedure Staff     Reading Group: [facility] Sonographer: Mandy Gan RDCS Ordering Physician: CORNELL EUBANKS  Electronically signed by MD CHELSEA HILL on 08/28/2024 at 02:10 PM     EKG: See Chart.    Diagnosis:      1. Valgus deformity, not elsewhere classified, left ankle    Plans:     1. LEFT ANKLE  PANTALAR FUSION      BARRY Zhu CNP  10/4/2024  1:03 PM

## 2024-10-25 NOTE — ANESTHESIA PROCEDURE NOTES
Peripheral Block    Patient location during procedure: pre-op  Reason for block: post-op pain management and at surgeon's request  Start time: 10/25/2024 7:10 AM  End time: 10/25/2024 7:15 AM  Staffing  Performed: anesthesiologist   Anesthesiologist: Kathie Wahl MD  Performed by: Kathie Wahl MD  Authorized by: Kathie Wahl MD    Preanesthetic Checklist  Completed: patient identified, IV checked, site marked, risks and benefits discussed, surgical/procedural consents, equipment checked, pre-op evaluation, timeout performed, anesthesia consent given, oxygen available, monitors applied/VS acknowledged, fire risk safety assessment completed and verbalized and blood product R/B/A discussed and consented  Peripheral Block   Patient position: supine  Prep: ChloraPrep  Provider prep: mask and sterile gloves  Patient monitoring: cardiac monitor, continuous pulse ox, frequent blood pressure checks and IV access  Block type: Saphenous and Sciatic  Laterality: left  Injection technique: single-shot  Guidance: ultrasound guided    Needle   Needle type: insulated echogenic nerve stimulator needle   Needle localization: ultrasound guidance  Assessment   Injection assessment: negative aspiration for heme, no paresthesia on injection, local visualized surrounding nerve on ultrasound and no intravascular symptoms  Slow fractionated injection: yes  Hemodynamics: stable  Outcomes: uncomplicated    Additional Notes  U/S [id number]  Medications Administered  ropivacaine (NAROPIN) injection 0.5% - Perineural   40 mL - 10/25/2024 7:10:00 AM

## 2024-10-25 NOTE — BRIEF OP NOTE
Brief Postoperative Note      Patient: Shaila Souza  YOB: 1950  MRN: [id number]    Date of Procedure: 10/25/2024    Pre-Op Diagnosis Codes:      * Valgus deformity, not elsewhere classified, left ankle [M21.072]    Post-Op Diagnosis: Same       Procedures:  Pantalar arthrodesis, left ankle  Fibular takedown, left ankle  Medial column fusion, left foot    Surgeon(s):  Jemma Clark DPM    Assistant:  Resident: Yenni Kidd DPM; Jamin Hammer DPM    Anesthesia: General, popliteal block preoperatively by the anesthesia team.    Hemostasis: A pneumatic thigh tourniquet was applied to the operated extremity and inflated to 275 mmHg for 120 minutes.  The tourniquet was let down thereafter.  In addition, Gelfoam thrombin, direct pressure and electrocautery was used for hemostasis.     Injectables: None     Estimated Blood Loss (mL): 300     Complications: None    Specimens:   * No specimens in log *    Implants:  Implant Name Type Inv. Item Serial No.  Lot No. LRB No. Used Action   GRAFT BNE 5 CC CELLULAR BNE MTRX V92 FC+ - [id number]  GRAFT BNE 5 CC CELLULAR BNE MTRX V92 FC+  PARAGON 28-WD [id number] Left 1 Implanted   NAIL IM 10X250 MM PHANTOM ACTIVCORE - [id number]  NAIL IM 10X250 MM PHANTOM ACTIVCORE  PARAGON 28-WD [id number] Left 1 Implanted   GRAFT BNE SUB 16MM WDG SUBTUBULAR FOR DISTR ARTH - [id number]  GRAFT BNE SUB 16MM WDG SUBTUBULAR FOR DISTR ARTH  PARAGON 28-WD [id number] Left 1 Implanted   SCREW BNE HDLSS 7.2X70 MM CALCANEUS HINDFOOT NAIL NS PHANTOM - [id number]  SCREW BNE HDLSS 7.2X70 MM CALCANEUS HINDFOOT NAIL NS PHANTOM  PARAGON 28-WD  Left 1 Implanted   SCREW BNE CROSSING 5X28 MM HINDFOOT HD THRD NAIL PHANTOM - [id number]  SCREW BNE CROSSING 5X28 MM HINDFOOT HD THRD NAIL PHANTOM  PARAGON 28-WD  Left 1 Implanted   SCREW BNE CROSSING 5X26 MM HINDFOOT HD THRD NAIL PHANTOM - [id number]  SCREW BNE CROSSING 5X26 MM HINDFOOT HD THRD NAIL PHANTOM  PARAGON 28-WD  Left

## 2024-10-26 PROBLEM — R10.13 DYSPEPSIA: Status: ACTIVE | Noted: 2024-10-26

## 2024-10-26 LAB
ANION GAP SERPL CALCULATED.3IONS-SCNC: 12 MMOL/L (ref 9–17)
BASOPHILS # BLD: 0 K/UL (ref 0–0.2)
BASOPHILS NFR BLD: 0 %
BUN SERPL-MCNC: 16 MG/DL (ref 8–23)
BUN/CREAT SERPL: 18 (ref 9–20)
CALCIUM SERPL-MCNC: 9.6 MG/DL (ref 8.6–10.4)
CHLORIDE SERPL-SCNC: 97 MMOL/L (ref 98–107)
CO2 SERPL-SCNC: 23 MMOL/L (ref 20–31)
CREAT SERPL-MCNC: 0.9 MG/DL (ref 0.5–0.9)
EOSINOPHIL # BLD: 0 K/UL (ref 0–0.4)
EOSINOPHILS RELATIVE PERCENT: 0 % (ref 1–4)
ERYTHROCYTE [DISTWIDTH] IN BLOOD BY AUTOMATED COUNT: 13.7 % (ref 11.8–14.4)
GFR, ESTIMATED: 67 ML/MIN/1.73M2
GLUCOSE SERPL-MCNC: 156 MG/DL (ref 70–99)
HCT VFR BLD AUTO: 31.1 % (ref 36.3–47.1)
HGB BLD-MCNC: 10.6 G/DL (ref 11.9–15.1)
IMM GRANULOCYTES # BLD AUTO: 0.15 K/UL (ref 0–0.3)
IMM GRANULOCYTES NFR BLD: 1 %
LYMPHOCYTES NFR BLD: 1.08 K/UL (ref 1–4.8)
LYMPHOCYTES RELATIVE PERCENT: 7 % (ref 24–44)
MCH RBC QN AUTO: 32.4 PG (ref 25.2–33.5)
MCHC RBC AUTO-ENTMCNC: 34.1 G/DL (ref 28.4–34.8)
MCV RBC AUTO: 95.1 FL (ref 82.6–102.9)
MONOCYTES NFR BLD: 1.69 K/UL (ref 0.2–0.8)
MONOCYTES NFR BLD: 11 % (ref 1–7)
NEUTROPHILS NFR BLD: 81 % (ref 36–66)
NEUTS SEG NFR BLD: 12.48 K/UL (ref 1.8–7.7)
NRBC BLD-RTO: 0 PER 100 WBC
PLATELET # BLD AUTO: 223 K/UL (ref 138–453)
PMV BLD AUTO: 9.8 FL (ref 8.1–13.5)
POTASSIUM SERPL-SCNC: 4.3 MMOL/L (ref 3.7–5.3)
RBC # BLD AUTO: 3.27 M/UL (ref 3.95–5.11)
SODIUM SERPL-SCNC: 132 MMOL/L (ref 135–144)
WBC OTHER # BLD: 15.4 K/UL (ref 3.5–11.3)

## 2024-10-26 PROCEDURE — 6360000002 HC RX W HCPCS

## 2024-10-26 PROCEDURE — 6370000000 HC RX 637 (ALT 250 FOR IP)

## 2024-10-26 PROCEDURE — 36415 COLL VENOUS BLD VENIPUNCTURE: CPT

## 2024-10-26 PROCEDURE — 99232 SBSQ HOSP IP/OBS MODERATE 35: CPT | Performed by: INTERNAL MEDICINE

## 2024-10-26 PROCEDURE — 97162 PT EVAL MOD COMPLEX 30 MIN: CPT

## 2024-10-26 PROCEDURE — 97116 GAIT TRAINING THERAPY: CPT

## 2024-10-26 PROCEDURE — 85025 COMPLETE CBC W/AUTO DIFF WBC: CPT

## 2024-10-26 PROCEDURE — 1200000000 HC SEMI PRIVATE

## 2024-10-26 PROCEDURE — 97166 OT EVAL MOD COMPLEX 45 MIN: CPT

## 2024-10-26 PROCEDURE — 97110 THERAPEUTIC EXERCISES: CPT

## 2024-10-26 PROCEDURE — 6370000000 HC RX 637 (ALT 250 FOR IP): Performed by: INTERNAL MEDICINE

## 2024-10-26 PROCEDURE — 80048 BASIC METABOLIC PNL TOTAL CA: CPT

## 2024-10-26 PROCEDURE — 2580000003 HC RX 258

## 2024-10-26 PROCEDURE — 97530 THERAPEUTIC ACTIVITIES: CPT

## 2024-10-26 PROCEDURE — 97535 SELF CARE MNGMENT TRAINING: CPT

## 2024-10-26 PROCEDURE — 93005 ELECTROCARDIOGRAM TRACING: CPT

## 2024-10-26 RX ORDER — FAMOTIDINE 20 MG/1
20 TABLET, FILM COATED ORAL 2 TIMES DAILY
Status: DISCONTINUED | OUTPATIENT
Start: 2024-10-26 | End: 2024-10-28 | Stop reason: HOSPADM

## 2024-10-26 RX ORDER — MAGNESIUM HYDROXIDE/ALUMINUM HYDROXICE/SIMETHICONE 120; 1200; 1200 MG/30ML; MG/30ML; MG/30ML
30 SUSPENSION ORAL EVERY 6 HOURS PRN
Status: DISCONTINUED | OUTPATIENT
Start: 2024-10-26 | End: 2024-10-28 | Stop reason: HOSPADM

## 2024-10-26 RX ADMIN — Medication 400 MG: at 08:46

## 2024-10-26 RX ADMIN — PRAVASTATIN SODIUM 80 MG: 40 TABLET ORAL at 20:26

## 2024-10-26 RX ADMIN — AMLODIPINE BESYLATE 10 MG: 10 TABLET ORAL at 10:06

## 2024-10-26 RX ADMIN — HYDROMORPHONE HYDROCHLORIDE 0.5 MG: 1 INJECTION, SOLUTION INTRAMUSCULAR; INTRAVENOUS; SUBCUTANEOUS at 20:32

## 2024-10-26 RX ADMIN — LISINOPRIL 40 MG: 40 TABLET ORAL at 10:07

## 2024-10-26 RX ADMIN — ALUMINUM HYDROXIDE, MAGNESIUM HYDROXIDE, AND SIMETHICONE 30 ML: 200; 200; 20 SUSPENSION ORAL at 16:26

## 2024-10-26 RX ADMIN — SODIUM CHLORIDE, PRESERVATIVE FREE 10 ML: 5 INJECTION INTRAVENOUS at 10:48

## 2024-10-26 RX ADMIN — CYCLOBENZAPRINE 10 MG: 10 TABLET, FILM COATED ORAL at 08:46

## 2024-10-26 RX ADMIN — SODIUM CHLORIDE, PRESERVATIVE FREE 10 ML: 5 INJECTION INTRAVENOUS at 20:33

## 2024-10-26 RX ADMIN — ALLOPURINOL 50 MG: 100 TABLET ORAL at 08:46

## 2024-10-26 RX ADMIN — CETIRIZINE HYDROCHLORIDE 10 MG: 10 TABLET, FILM COATED ORAL at 08:46

## 2024-10-26 RX ADMIN — FAMOTIDINE 20 MG: 20 TABLET, FILM COATED ORAL at 20:26

## 2024-10-26 RX ADMIN — Medication 2000 MG: at 18:25

## 2024-10-26 RX ADMIN — DOXAZOSIN 2 MG: 1 TABLET ORAL at 20:26

## 2024-10-26 RX ADMIN — Medication 500 UNITS: at 08:43

## 2024-10-26 RX ADMIN — CYCLOBENZAPRINE 10 MG: 10 TABLET, FILM COATED ORAL at 18:25

## 2024-10-26 RX ADMIN — SODIUM CHLORIDE, PRESERVATIVE FREE 10 ML: 5 INJECTION INTRAVENOUS at 08:47

## 2024-10-26 RX ADMIN — OXYCODONE AND ACETAMINOPHEN 1 TABLET: 5; 325 TABLET ORAL at 13:47

## 2024-10-26 RX ADMIN — ENOXAPARIN SODIUM 40 MG: 100 INJECTION SUBCUTANEOUS at 18:25

## 2024-10-26 RX ADMIN — POTASSIUM CHLORIDE 10 MEQ: 1500 TABLET, EXTENDED RELEASE ORAL at 08:45

## 2024-10-26 RX ADMIN — Medication 500 MG: at 02:50

## 2024-10-26 RX ADMIN — ONDANSETRON 4 MG: 2 INJECTION, SOLUTION INTRAMUSCULAR; INTRAVENOUS at 08:46

## 2024-10-26 RX ADMIN — OXYCODONE AND ACETAMINOPHEN 1 TABLET: 5; 325 TABLET ORAL at 18:25

## 2024-10-26 RX ADMIN — OXYCODONE AND ACETAMINOPHEN 1 TABLET: 5; 325 TABLET ORAL at 00:07

## 2024-10-26 RX ADMIN — FAMOTIDINE 20 MG: 20 TABLET, FILM COATED ORAL at 14:05

## 2024-10-26 RX ADMIN — Medication 2000 MG: at 02:50

## 2024-10-26 RX ADMIN — OXYCODONE AND ACETAMINOPHEN 1 TABLET: 5; 325 TABLET ORAL at 04:55

## 2024-10-26 RX ADMIN — Medication 3 MG: at 20:25

## 2024-10-26 RX ADMIN — Medication 2000 MG: at 10:48

## 2024-10-26 ASSESSMENT — PAIN SCALES - GENERAL
PAINLEVEL_OUTOF10: 1
PAINLEVEL_OUTOF10: 6
PAINLEVEL_OUTOF10: 8
PAINLEVEL_OUTOF10: 4
PAINLEVEL_OUTOF10: 5
PAINLEVEL_OUTOF10: 7
PAINLEVEL_OUTOF10: 5
PAINLEVEL_OUTOF10: 7
PAINLEVEL_OUTOF10: 5
PAINLEVEL_OUTOF10: 8

## 2024-10-26 ASSESSMENT — PAIN DESCRIPTION - FREQUENCY
FREQUENCY: INTERMITTENT

## 2024-10-26 ASSESSMENT — PAIN DESCRIPTION - ONSET
ONSET: ON-GOING
ONSET: ON-GOING

## 2024-10-26 ASSESSMENT — PAIN DESCRIPTION - PAIN TYPE
TYPE: SURGICAL PAIN
TYPE: ACUTE PAIN
TYPE: SURGICAL PAIN

## 2024-10-26 ASSESSMENT — PAIN DESCRIPTION - ORIENTATION
ORIENTATION: LEFT

## 2024-10-26 ASSESSMENT — PAIN DESCRIPTION - DESCRIPTORS
DESCRIPTORS: SHARP
DESCRIPTORS: SHARP;SHOOTING
DESCRIPTORS: SHARP;SHOOTING
DESCRIPTORS: ACHING;DISCOMFORT;SHARP

## 2024-10-26 ASSESSMENT — PAIN DESCRIPTION - LOCATION
LOCATION: ANKLE;FOOT
LOCATION: FOOT

## 2024-10-26 ASSESSMENT — PAIN - FUNCTIONAL ASSESSMENT
PAIN_FUNCTIONAL_ASSESSMENT: ACTIVITIES ARE NOT PREVENTED
PAIN_FUNCTIONAL_ASSESSMENT: ACTIVITIES ARE NOT PREVENTED

## 2024-10-27 LAB
ANION GAP SERPL CALCULATED.3IONS-SCNC: 9 MMOL/L (ref 9–17)
BASOPHILS # BLD: 0 K/UL (ref 0–0.2)
BASOPHILS NFR BLD: 0 %
BUN SERPL-MCNC: 11 MG/DL (ref 8–23)
BUN/CREAT SERPL: 12 (ref 9–20)
CALCIUM SERPL-MCNC: 8.4 MG/DL (ref 8.6–10.4)
CHLORIDE SERPL-SCNC: 97 MMOL/L (ref 98–107)
CO2 SERPL-SCNC: 25 MMOL/L (ref 20–31)
CREAT SERPL-MCNC: 0.9 MG/DL (ref 0.5–0.9)
EOSINOPHIL # BLD: 0 K/UL (ref 0–0.4)
EOSINOPHILS RELATIVE PERCENT: 0 % (ref 1–4)
ERYTHROCYTE [DISTWIDTH] IN BLOOD BY AUTOMATED COUNT: 13.8 % (ref 11.8–14.4)
GFR, ESTIMATED: 67 ML/MIN/1.73M2
GLUCOSE SERPL-MCNC: 110 MG/DL (ref 70–99)
HCT VFR BLD AUTO: 26.7 % (ref 36.3–47.1)
HGB BLD-MCNC: 8.9 G/DL (ref 11.9–15.1)
IMM GRANULOCYTES # BLD AUTO: 0.14 K/UL (ref 0–0.3)
IMM GRANULOCYTES NFR BLD: 1 %
LYMPHOCYTES NFR BLD: 2.12 K/UL (ref 1–4.8)
LYMPHOCYTES RELATIVE PERCENT: 15 % (ref 24–44)
MCH RBC QN AUTO: 32 PG (ref 25.2–33.5)
MCHC RBC AUTO-ENTMCNC: 33.3 G/DL (ref 28.4–34.8)
MCV RBC AUTO: 96 FL (ref 82.6–102.9)
MONOCYTES NFR BLD: 1.55 K/UL (ref 0.2–0.8)
MONOCYTES NFR BLD: 11 % (ref 1–7)
NEUTROPHILS NFR BLD: 73 % (ref 36–66)
NEUTS SEG NFR BLD: 10.29 K/UL (ref 1.8–7.7)
NRBC BLD-RTO: 0 PER 100 WBC
PLATELET # BLD AUTO: 197 K/UL (ref 138–453)
PMV BLD AUTO: 10 FL (ref 8.1–13.5)
POTASSIUM SERPL-SCNC: 4.5 MMOL/L (ref 3.7–5.3)
RBC # BLD AUTO: 2.78 M/UL (ref 3.95–5.11)
SODIUM SERPL-SCNC: 131 MMOL/L (ref 135–144)
WBC OTHER # BLD: 14.1 K/UL (ref 3.5–11.3)

## 2024-10-27 PROCEDURE — 6370000000 HC RX 637 (ALT 250 FOR IP): Performed by: INTERNAL MEDICINE

## 2024-10-27 PROCEDURE — 97530 THERAPEUTIC ACTIVITIES: CPT

## 2024-10-27 PROCEDURE — 36415 COLL VENOUS BLD VENIPUNCTURE: CPT

## 2024-10-27 PROCEDURE — 99232 SBSQ HOSP IP/OBS MODERATE 35: CPT | Performed by: INTERNAL MEDICINE

## 2024-10-27 PROCEDURE — 1200000000 HC SEMI PRIVATE

## 2024-10-27 PROCEDURE — 2580000003 HC RX 258

## 2024-10-27 PROCEDURE — 80048 BASIC METABOLIC PNL TOTAL CA: CPT

## 2024-10-27 PROCEDURE — 85025 COMPLETE CBC W/AUTO DIFF WBC: CPT

## 2024-10-27 PROCEDURE — 6360000002 HC RX W HCPCS

## 2024-10-27 PROCEDURE — 6370000000 HC RX 637 (ALT 250 FOR IP)

## 2024-10-27 RX ORDER — CALCIUM CARBONATE 500 MG/1
1000 TABLET, CHEWABLE ORAL 3 TIMES DAILY PRN
Status: DISCONTINUED | OUTPATIENT
Start: 2024-10-27 | End: 2024-10-28 | Stop reason: HOSPADM

## 2024-10-27 RX ORDER — OXYCODONE AND ACETAMINOPHEN 5; 325 MG/1; MG/1
1 TABLET ORAL EVERY 4 HOURS PRN
Qty: 28 TABLET | Refills: 0 | Status: SHIPPED | OUTPATIENT
Start: 2024-10-27 | End: 2024-11-03

## 2024-10-27 RX ORDER — POLYETHYLENE GLYCOL 3350 17 G/17G
17 POWDER, FOR SOLUTION ORAL DAILY
Status: DISCONTINUED | OUTPATIENT
Start: 2024-10-27 | End: 2024-10-28 | Stop reason: HOSPADM

## 2024-10-27 RX ADMIN — SODIUM CHLORIDE, PRESERVATIVE FREE 10 ML: 5 INJECTION INTRAVENOUS at 20:19

## 2024-10-27 RX ADMIN — Medication 2000 MG: at 03:22

## 2024-10-27 RX ADMIN — POLYETHYLENE GLYCOL 3350 17 G: 17 POWDER, FOR SOLUTION ORAL at 10:52

## 2024-10-27 RX ADMIN — Medication 400 MG: at 08:27

## 2024-10-27 RX ADMIN — FAMOTIDINE 20 MG: 20 TABLET, FILM COATED ORAL at 08:27

## 2024-10-27 RX ADMIN — DOXAZOSIN 2 MG: 1 TABLET ORAL at 20:19

## 2024-10-27 RX ADMIN — OXYCODONE AND ACETAMINOPHEN 1 TABLET: 5; 325 TABLET ORAL at 00:20

## 2024-10-27 RX ADMIN — POTASSIUM CHLORIDE 10 MEQ: 1500 TABLET, EXTENDED RELEASE ORAL at 08:27

## 2024-10-27 RX ADMIN — OXYCODONE AND ACETAMINOPHEN 1 TABLET: 5; 325 TABLET ORAL at 12:33

## 2024-10-27 RX ADMIN — ALLOPURINOL 50 MG: 100 TABLET ORAL at 08:29

## 2024-10-27 RX ADMIN — CETIRIZINE HYDROCHLORIDE 10 MG: 10 TABLET, FILM COATED ORAL at 08:27

## 2024-10-27 RX ADMIN — FAMOTIDINE 20 MG: 20 TABLET, FILM COATED ORAL at 20:19

## 2024-10-27 RX ADMIN — Medication 500 UNITS: at 08:28

## 2024-10-27 RX ADMIN — CYCLOBENZAPRINE 10 MG: 10 TABLET, FILM COATED ORAL at 11:03

## 2024-10-27 RX ADMIN — LISINOPRIL 40 MG: 40 TABLET ORAL at 08:27

## 2024-10-27 RX ADMIN — ENOXAPARIN SODIUM 40 MG: 100 INJECTION SUBCUTANEOUS at 18:02

## 2024-10-27 RX ADMIN — SODIUM CHLORIDE, PRESERVATIVE FREE 10 ML: 5 INJECTION INTRAVENOUS at 10:53

## 2024-10-27 RX ADMIN — PRAVASTATIN SODIUM 80 MG: 40 TABLET ORAL at 20:19

## 2024-10-27 RX ADMIN — Medication 3 MG: at 20:19

## 2024-10-27 RX ADMIN — Medication 2000 MG: at 18:03

## 2024-10-27 RX ADMIN — Medication 2000 MG: at 10:53

## 2024-10-27 RX ADMIN — AMLODIPINE BESYLATE 10 MG: 10 TABLET ORAL at 08:27

## 2024-10-27 ASSESSMENT — PAIN DESCRIPTION - DESCRIPTORS
DESCRIPTORS: ACHING;DISCOMFORT;SHARP
DESCRIPTORS: SHOOTING;SQUEEZING

## 2024-10-27 ASSESSMENT — PAIN DESCRIPTION - ONSET: ONSET: ON-GOING

## 2024-10-27 ASSESSMENT — PAIN DESCRIPTION - LOCATION
LOCATION: FOOT;ANKLE
LOCATION: LEG;FOOT

## 2024-10-27 ASSESSMENT — PAIN SCALES - GENERAL
PAINLEVEL_OUTOF10: 7
PAINLEVEL_OUTOF10: 6
PAINLEVEL_OUTOF10: 6

## 2024-10-27 ASSESSMENT — PAIN DESCRIPTION - ORIENTATION
ORIENTATION: LEFT
ORIENTATION: LEFT

## 2024-10-27 ASSESSMENT — PAIN DESCRIPTION - PAIN TYPE: TYPE: ACUTE PAIN

## 2024-10-27 ASSESSMENT — PAIN DESCRIPTION - FREQUENCY: FREQUENCY: CONTINUOUS

## 2024-10-27 NOTE — DISCHARGE INSTR - COC
10/26/24 1927   Closure Other (Comment) 10/26/24 1927   Margins Approximated 10/25/24 1428   Incision Assessment Other (Comment) 10/27/24 0800   Drainage Amount None (dry) 10/27/24 0800   Ashley-incision Assessment Other (Comment) 10/26/24 1927   Number of days: 2        Elimination:  Continence:   Bowel: Yes  Bladder: Yes  Urinary Catheter: None   Colostomy/Ileostomy/Ileal Conduit: No       Date of Last BM: 10/27/24    Intake/Output Summary (Last 24 hours) at 10/27/2024 1026  Last data filed at 10/27/2024 0319  Gross per 24 hour   Intake --   Output 1500 ml   Net -1500 ml     I/O last 3 completed shifts:  In: 400 [P.O.:400]  Out: 2000 [Urine:2000]    Safety Concerns:     History of Falls (last 30 days) and At Risk for Falls    Impairments/Disabilities:      None    Nutrition Therapy:  Current Nutrition Therapy:   - Oral Diet:  General    Routes of Feeding: Oral  Liquids: Thin Liquids  Daily Fluid Restriction: no  Last Modified Barium Swallow with Video (Video Swallowing Test): not done    Treatments at the Time of Hospital Discharge:   Respiratory Treatments: none  Oxygen Therapy:  is not on home oxygen therapy.  Ventilator:    - No ventilator support    Rehab Therapies: Physical Therapy and Occupational Therapy  Weight Bearing Status/Restrictions: Non-weight bearing on left leg  Other Medical Equipment (for information only, NOT a DME order):  walker  Other Treatments:     Patient's personal belongings (please select all that are sent with patient):  Hearing Aides bilateral    RN SIGNATURE:  Electronically signed by Yaneli Bergeron RN on 10/28/24 at 1:12 PM EDT    CASE MANAGEMENT/SOCIAL WORK SECTION    Inpatient Status Date: ***    Readmission Risk Assessment Score:  Readmission Risk              Risk of Unplanned Readmission:  15           Discharging to Facility/ Agency   Name: Name: [facility]   Address: [address] [place] [address]   Phone:  [phone]  Fax:

## 2024-10-28 VITALS
HEART RATE: 88 BPM | BODY MASS INDEX: 32.6 KG/M2 | HEIGHT: 63 IN | OXYGEN SATURATION: 96 % | RESPIRATION RATE: 18 BRPM | TEMPERATURE: 98.1 F | WEIGHT: 184 LBS | SYSTOLIC BLOOD PRESSURE: 138 MMHG | DIASTOLIC BLOOD PRESSURE: 72 MMHG

## 2024-10-28 LAB
ANION GAP SERPL CALCULATED.3IONS-SCNC: 10 MMOL/L (ref 9–17)
BASOPHILS # BLD: 0.04 K/UL (ref 0–0.2)
BASOPHILS NFR BLD: 0 % (ref 0–2)
BUN SERPL-MCNC: 9 MG/DL (ref 8–23)
BUN/CREAT SERPL: 10 (ref 9–20)
CALCIUM SERPL-MCNC: 8.3 MG/DL (ref 8.6–10.4)
CHLORIDE SERPL-SCNC: 97 MMOL/L (ref 98–107)
CO2 SERPL-SCNC: 24 MMOL/L (ref 20–31)
CREAT SERPL-MCNC: 0.9 MG/DL (ref 0.5–0.9)
EOSINOPHIL # BLD: 0.06 K/UL (ref 0–0.44)
EOSINOPHILS RELATIVE PERCENT: 1 % (ref 1–4)
ERYTHROCYTE [DISTWIDTH] IN BLOOD BY AUTOMATED COUNT: 13.3 % (ref 11.8–14.4)
GFR, ESTIMATED: 67 ML/MIN/1.73M2
GLUCOSE SERPL-MCNC: 110 MG/DL (ref 70–99)
HCT VFR BLD AUTO: 25.5 % (ref 36.3–47.1)
HGB BLD-MCNC: 8.7 G/DL (ref 11.9–15.1)
IMM GRANULOCYTES # BLD AUTO: 0.05 K/UL (ref 0–0.3)
IMM GRANULOCYTES NFR BLD: 1 %
LYMPHOCYTES NFR BLD: 1.75 K/UL (ref 1.1–3.7)
LYMPHOCYTES RELATIVE PERCENT: 16 % (ref 24–43)
MCH RBC QN AUTO: 32.6 PG (ref 25.2–33.5)
MCHC RBC AUTO-ENTMCNC: 34.1 G/DL (ref 28.4–34.8)
MCV RBC AUTO: 95.5 FL (ref 82.6–102.9)
MONOCYTES NFR BLD: 1.22 K/UL (ref 0.1–1.2)
MONOCYTES NFR BLD: 11 % (ref 3–12)
NEUTROPHILS NFR BLD: 71 % (ref 36–65)
NEUTS SEG NFR BLD: 7.89 K/UL (ref 1.5–8.1)
NRBC BLD-RTO: 0 PER 100 WBC
PLATELET # BLD AUTO: 198 K/UL (ref 138–453)
PMV BLD AUTO: 10.2 FL (ref 8.1–13.5)
POTASSIUM SERPL-SCNC: 4.1 MMOL/L (ref 3.7–5.3)
RBC # BLD AUTO: 2.67 M/UL (ref 3.95–5.11)
SODIUM SERPL-SCNC: 131 MMOL/L (ref 135–144)
WBC OTHER # BLD: 11 K/UL (ref 3.5–11.3)

## 2024-10-28 PROCEDURE — 97110 THERAPEUTIC EXERCISES: CPT

## 2024-10-28 PROCEDURE — 6360000002 HC RX W HCPCS

## 2024-10-28 PROCEDURE — 97535 SELF CARE MNGMENT TRAINING: CPT

## 2024-10-28 PROCEDURE — 85025 COMPLETE CBC W/AUTO DIFF WBC: CPT

## 2024-10-28 PROCEDURE — 6370000000 HC RX 637 (ALT 250 FOR IP): Performed by: INTERNAL MEDICINE

## 2024-10-28 PROCEDURE — 99232 SBSQ HOSP IP/OBS MODERATE 35: CPT | Performed by: INTERNAL MEDICINE

## 2024-10-28 PROCEDURE — 80048 BASIC METABOLIC PNL TOTAL CA: CPT

## 2024-10-28 PROCEDURE — 6370000000 HC RX 637 (ALT 250 FOR IP)

## 2024-10-28 PROCEDURE — 97116 GAIT TRAINING THERAPY: CPT

## 2024-10-28 PROCEDURE — 36415 COLL VENOUS BLD VENIPUNCTURE: CPT

## 2024-10-28 PROCEDURE — 2580000003 HC RX 258

## 2024-10-28 PROCEDURE — 97530 THERAPEUTIC ACTIVITIES: CPT

## 2024-10-28 RX ADMIN — POTASSIUM CHLORIDE 10 MEQ: 1500 TABLET, EXTENDED RELEASE ORAL at 10:56

## 2024-10-28 RX ADMIN — CETIRIZINE HYDROCHLORIDE 10 MG: 10 TABLET, FILM COATED ORAL at 10:37

## 2024-10-28 RX ADMIN — SODIUM CHLORIDE, PRESERVATIVE FREE 10 ML: 5 INJECTION INTRAVENOUS at 10:39

## 2024-10-28 RX ADMIN — POLYETHYLENE GLYCOL 3350 17 G: 17 POWDER, FOR SOLUTION ORAL at 10:38

## 2024-10-28 RX ADMIN — Medication 500 UNITS: at 10:36

## 2024-10-28 RX ADMIN — Medication 2000 MG: at 01:32

## 2024-10-28 RX ADMIN — OXYCODONE AND ACETAMINOPHEN 1 TABLET: 5; 325 TABLET ORAL at 14:13

## 2024-10-28 RX ADMIN — ALLOPURINOL 50 MG: 100 TABLET ORAL at 10:33

## 2024-10-28 RX ADMIN — Medication 400 MG: at 10:37

## 2024-10-28 RX ADMIN — LISINOPRIL 40 MG: 40 TABLET ORAL at 10:37

## 2024-10-28 RX ADMIN — FAMOTIDINE 20 MG: 20 TABLET, FILM COATED ORAL at 10:36

## 2024-10-28 RX ADMIN — Medication 2000 MG: at 10:38

## 2024-10-28 RX ADMIN — AMLODIPINE BESYLATE 10 MG: 10 TABLET ORAL at 10:35

## 2024-10-28 RX ADMIN — SODIUM CHLORIDE, PRESERVATIVE FREE 10 ML: 5 INJECTION INTRAVENOUS at 01:32

## 2024-10-28 ASSESSMENT — PAIN SCALES - GENERAL
PAINLEVEL_OUTOF10: 0
PAINLEVEL_OUTOF10: 1
PAINLEVEL_OUTOF10: 7

## 2024-10-28 ASSESSMENT — PAIN - FUNCTIONAL ASSESSMENT: PAIN_FUNCTIONAL_ASSESSMENT: PREVENTS OR INTERFERES SOME ACTIVE ACTIVITIES AND ADLS

## 2024-10-28 ASSESSMENT — PAIN DESCRIPTION - ORIENTATION: ORIENTATION: LEFT

## 2024-10-28 ASSESSMENT — PAIN DESCRIPTION - LOCATION
LOCATION: ANKLE
LOCATION: FOOT

## 2024-10-28 ASSESSMENT — PAIN DESCRIPTION - DESCRIPTORS: DESCRIPTORS: ACHING

## 2024-10-28 NOTE — CARE COORDINATION
Social Work- [facility] will have bed available for patient. Wilfredo  
Social Work-[facility] will transport patient to [facility] at 4PM. Orders faxed. Nurse to call report to [phone]. HENS completed. Discussed with patient. She is agreeable with dc plans.   
Social work: spoke to Johnniemedina samuel for [facility] and they will accept once PT/OT NOTES and updates provided after discharge. Will need lis and Rx at discharge. Laney jimenez  
Assistance needed at discharge: Skilled Nursing Facility            Potential DME:    Patient expects to discharge to: Rehabilitation facility  Plan for transportation at discharge: Family    Financial    Payor: MEDICARE / Plan: MEDICARE PART A AND B / Product Type: *No Product type* /     Does insurance require precert for SNF: No    Potential assistance Purchasing Medications:    Meds-to-Beds request: No      [facility] [id number] - [place] - [address] [place] [phone] - F [phone]  [address] [place] [address]  Phone: [phone] Fax: [phone]      Notes:    Factors facilitating achievement of predicted outcomes: Motivated, Cooperative, and Pleasant    Barriers to discharge: Pain and Medical complications    Additional Case Management Notes:     Pt is a+o, independent and drove pta. From home alone, has walker, cane, crutches. Discussed HHC vs SNF pt prefers rehab. Pt son works for [employer] and prefers to go there, declines snf list. Ref sent to [facility].     The Plan for Transition of Care is related to the following treatment goals of Valgus deformity, not elsewhere classified, left ankle [M21.072]  Post-op pain [G89.18]    IF APPLICABLE: The Patient and/or patient representative Shaila and her family were provided with a choice of provider and agrees with the discharge plan. Freedom of choice list with basic dialogue that supports the patient's individualized plan of care/goals and shares the quality data associated with the providers was provided to:     Patient Representative Name:       The Patient and/or Patient Representative Agree with the Discharge Plan?      Merly Dunn RN  Case Management Department  Ph:  Fax:

## 2024-10-28 NOTE — PLAN OF CARE
Problem: Discharge Planning  Goal: Discharge to home or other facility with appropriate resources  10/27/2024 1701 by Franklin Brown RN  Outcome: Progressing     Problem: Pain  Goal: Verbalizes/displays adequate comfort level or baseline comfort level  10/27/2024 1701 by Franklin Brown RN  Outcome: Progressing     Problem: ABCDS Injury Assessment  Goal: Absence of physical injury  10/27/2024 1701 by Franklin Brown RN  Outcome: Progressing     Problem: Neurosensory - Adult  Goal: Achieves stable or improved neurological status  10/27/2024 1701 by Franklin Brown RN  Outcome: Progressing     Problem: Neurosensory - Adult  Goal: Achieves maximal functionality and self care  10/27/2024 1701 by Franklin Brown RN  Outcome: Progressing     Problem: Skin/Tissue Integrity - Adult  Goal: Skin integrity remains intact  10/27/2024 1701 by Franklin Brown RN  Outcome: Progressing     Problem: Skin/Tissue Integrity - Adult  Goal: Incisions, wounds, or drain sites healing without S/S of infection  10/27/2024 1701 by Franklin Brown RN  Outcome: Progressing     Problem: Genitourinary - Adult  Goal: Absence of urinary retention  10/27/2024 1701 by Franklin Brown RN  Outcome: Progressing     Problem: Safety - Adult  Goal: Free from fall injury  10/27/2024 1701 by Franklin Brown RN  Outcome: Progressing     Problem: Musculoskeletal - Adult  Goal: Return mobility to safest level of function  10/27/2024 1701 by Franklin Brown RN  Outcome: Progressing     Problem: Musculoskeletal - Adult  Goal: Return ADL status to a safe level of function  10/27/2024 1701 by Franklin Brown RN  Outcome: Progressing     Problem: Gastrointestinal - Adult  Goal: Minimal or absence of nausea and vomiting  10/27/2024 1701 by Franklin Brown RN  Outcome: Progressing     
  Problem: Discharge Planning  Goal: Discharge to home or other facility with appropriate resources  10/28/2024 1016 by Yaneli Bergeron RN  Outcome: Progressing  Flowsheets (Taken 10/28/2024 0800)  Discharge to home or other facility with appropriate resources:   Identify barriers to discharge with patient and caregiver   Arrange for needed discharge resources and transportation as appropriate   Identify discharge learning needs (meds, wound care, etc)   Refer to discharge planning if patient needs post-hospital services based on physician order or complex needs related to functional status, cognitive ability or social support system  10/28/2024 0311 by Annette Arshad RN  Outcome: Progressing     Problem: Pain  Goal: Verbalizes/displays adequate comfort level or baseline comfort level  10/28/2024 1016 by Yaneli Bergeron RN  Outcome: Progressing  Flowsheets (Taken 10/28/2024 0800)  Verbalizes/displays adequate comfort level or baseline comfort level:   Administer analgesics based on type and severity of pain and evaluate response   Encourage patient to monitor pain and request assistance   Assess pain using appropriate pain scale  10/28/2024 0311 by Annette Arshad RN  Outcome: Progressing  Flowsheets (Taken 10/28/2024 0311)  Verbalizes/displays adequate comfort level or baseline comfort level:   Administer analgesics based on type and severity of pain and evaluate response   Consider cultural and social influences on pain and pain management   Encourage patient to monitor pain and request assistance   Assess pain using appropriate pain scale   Implement non-pharmacological measures as appropriate and evaluate response     Problem: ABCDS Injury Assessment  Goal: Absence of physical injury  10/28/2024 1016 by Yaneli Bergeron RN  Outcome: Progressing  Flowsheets (Taken 10/28/2024 1016)  Absence of Physical Injury: Implement safety measures based on patient assessment  10/28/2024 0311 by 
  Problem: Discharge Planning  Goal: Discharge to home or other facility with appropriate resources  10/28/2024 1313 by Yaneli Bergeron RN  Outcome: Adequate for Discharge  10/28/2024 1016 by Yaneli Bergeron RN  Outcome: Progressing  Flowsheets (Taken 10/28/2024 0800)  Discharge to home or other facility with appropriate resources:   Identify barriers to discharge with patient and caregiver   Arrange for needed discharge resources and transportation as appropriate   Identify discharge learning needs (meds, wound care, etc)   Refer to discharge planning if patient needs post-hospital services based on physician order or complex needs related to functional status, cognitive ability or social support system  10/28/2024 0311 by Annette Arshad RN  Outcome: Progressing     Problem: Pain  Goal: Verbalizes/displays adequate comfort level or baseline comfort level  10/28/2024 1313 by Yaneli Bergeron RN  Outcome: Adequate for Discharge  10/28/2024 1016 by Yaneli Bergeron RN  Outcome: Progressing  Flowsheets (Taken 10/28/2024 0800)  Verbalizes/displays adequate comfort level or baseline comfort level:   Administer analgesics based on type and severity of pain and evaluate response   Encourage patient to monitor pain and request assistance   Assess pain using appropriate pain scale  10/28/2024 0311 by Annette Arshad RN  Outcome: Progressing  Flowsheets (Taken 10/28/2024 0311)  Verbalizes/displays adequate comfort level or baseline comfort level:   Administer analgesics based on type and severity of pain and evaluate response   Consider cultural and social influences on pain and pain management   Encourage patient to monitor pain and request assistance   Assess pain using appropriate pain scale   Implement non-pharmacological measures as appropriate and evaluate response     Problem: ABCDS Injury Assessment  Goal: Absence of physical injury  10/28/2024 1313 by Yaneli Bergeron RN  Outcome: Adequate for 
  Problem: Discharge Planning  Goal: Discharge to home or other facility with appropriate resources  Outcome: Progressing     Problem: Pain  Goal: Verbalizes/displays adequate comfort level or baseline comfort level  10/26/2024 1206 by Franklin Brown RN  Outcome: Progressing     Problem: ABCDS Injury Assessment  Goal: Absence of physical injury  10/26/2024 1206 by Franklin Brown RN  Outcome: Progressing     Problem: Neurosensory - Adult  Goal: Achieves stable or improved neurological status  Outcome: Progressing     Problem: Neurosensory - Adult  Goal: Achieves maximal functionality and self care  Outcome: Progressing     Problem: Skin/Tissue Integrity - Adult  Goal: Skin integrity remains intact  10/26/2024 1206 by Franklni Brown RN  Outcome: Progressing     Problem: Skin/Tissue Integrity - Adult  Goal: Incisions, wounds, or drain sites healing without S/S of infection  Outcome: Progressing     Problem: Genitourinary - Adult  Goal: Absence of urinary retention  Outcome: Progressing     Problem: Safety - Adult  Goal: Free from fall injury  10/26/2024 1206 by Franklin Brown RN  Outcome: Progressing     Problem: Musculoskeletal - Adult  Goal: Return mobility to safest level of function  Outcome: Progressing     Problem: Musculoskeletal - Adult  Goal: Return ADL status to a safe level of function  Outcome: Progressing     Problem: Gastrointestinal - Adult  Goal: Minimal or absence of nausea and vomiting  Outcome: Progressing     
  Problem: Discharge Planning  Goal: Discharge to home or other facility with appropriate resources  Outcome: Progressing     Problem: Pain  Goal: Verbalizes/displays adequate comfort level or baseline comfort level  Outcome: Progressing     Problem: ABCDS Injury Assessment  Goal: Absence of physical injury  Outcome: Progressing     Problem: Neurosensory - Adult  Goal: Achieves stable or improved neurological status  Outcome: Progressing  Goal: Achieves maximal functionality and self care  Outcome: Progressing     Problem: Skin/Tissue Integrity - Adult  Goal: Skin integrity remains intact  Outcome: Progressing  Goal: Incisions, wounds, or drain sites healing without S/S of infection  Outcome: Progressing     Problem: Genitourinary - Adult  Goal: Absence of urinary retention  Outcome: Progressing     
  Problem: Pain  Goal: Verbalizes/displays adequate comfort level or baseline comfort level  10/26/2024 0315 by Kortney Callejas RN  Outcome: Progressing  Flowsheets (Taken 10/26/2024 0315)  Verbalizes/displays adequate comfort level or baseline comfort level:   Encourage patient to monitor pain and request assistance   Administer analgesics based on type and severity of pain and evaluate response   Assess pain using appropriate pain scale   Implement non-pharmacological measures as appropriate and evaluate response     Problem: ABCDS Injury Assessment  Goal: Absence of physical injury  10/26/2024 0315 by Kortney Callejas, RN  Outcome: Progressing  Flowsheets (Taken 10/26/2024 0315)  Absence of Physical Injury: Implement safety measures based on patient assessment     Problem: Skin/Tissue Integrity - Adult  Goal: Skin integrity remains intact  10/26/2024 0315 by Kortney Callejas, RN  Outcome: Progressing  Flowsheets  Taken 10/26/2024 0315  Skin Integrity Remains Intact: Monitor for areas of redness and/or skin breakdown  Taken 10/25/2024 2035  Skin Integrity Remains Intact: Monitor for areas of redness and/or skin breakdown     Problem: Safety - Adult  Goal: Free from fall injury  Outcome: Progressing  Flowsheets (Taken 10/26/2024 0315)  Free From Fall Injury: Instruct family/caregiver on patient safety     
Patient came in for a Left Ankle Planter Fusion.  She is NWB to the left foot.  She is able to wiggle her toes and does experience some tingling and numbness.  She is to work with PT/OT.  She has Flexeril, Percocet and Dilaudid for pain control.  She receives Ancef every 8 hrs as scheduled.  Plan is to go to [facility].  Patient is alert and oriented.  Patient can pivot with a walker and 2 assist.  Plan of care is ongoing.    Pain level assessment complete.   Patient educated on pain scale and control interventions  PRN pain medication given per patient request  Patient instructed to call out with new onset of pain or unrelieved pain    Problem: Pain  Goal: Verbalizes/displays adequate comfort level or baseline comfort level  Outcome: Progressing     Siderails up x 2  Hourly rounding  Call light in reach  Instructed to call for assist before attempting out of bed.  Remains free from falls and accidental injury at this time   Floor free from obstacles  Bed is locked and in lowest position  Adequate lighting provided  Bed alarm on, Red Falling star and Stay with Me signs posted      Problem: ABCDS Injury Assessment  Goal: Absence of physical injury  Outcome: Progressing     Problem: Safety - Adult  Goal: Free from fall injury  Outcome: Progressing     Problem: Musculoskeletal - Adult  Goal: Return mobility to safest level of function  Outcome: Progressing  Flowsheets (Taken 10/26/2024 1927)  Return Mobility to Safest Level of Function:   Assess patient stability and activity tolerance for standing, transferring and ambulating with or without assistive devices   Assist with transfers and ambulation using safe patient handling equipment as needed  Goal: Return ADL status to a safe level of function  Outcome: Progressing  Flowsheets (Taken 10/26/2024 1927)  Return ADL Status to a Safe Level of Function:   Administer medication as ordered   Assess activities of daily living deficits and provide assistive devices as 
  Problem: Genitourinary - Adult  Goal: Absence of urinary retention  10/28/2024 0311 by Annette Arshad RN  Outcome: Progressing     Problem: Musculoskeletal - Adult  Goal: Return mobility to safest level of function  10/28/2024 0311 by Annette Arshad RN  Outcome: Progressing     Problem: Musculoskeletal - Adult  Goal: Return ADL status to a safe level of function  10/28/2024 0311 by Annette Arshad RN  Outcome: Progressing     Problem: Gastrointestinal - Adult  Goal: Minimal or absence of nausea and vomiting  10/28/2024 0311 by Annette Arshad RN  Outcome: Progressing     Problem: Safety - Adult  Goal: Free from fall injury  10/28/2024 0311 by Annette Arshad RN  Outcome: Progressing

## 2024-10-28 NOTE — PROGRESS NOTES
Physician Progress Note      PATIENT:               YUNIEL CANTRELL  [facility] #:                  [id number]  :                       1950  ADMIT DATE:       10/25/2024 5:45 AM  DISCH DATE:  RESPONDING  PROVIDER #:        Kristopher Combs DO          QUERY TEXT:    Pt admitted for surgery of valgus deformity. Pt noted to have a drop in   hemoglobin. If possible, please document in the progress notes and discharge   summary if you are evaluating and/or treating any of the following:    The medical record reflects the following:  Risk Factors: Patient underwent: Pantalar arthrodesis, left ankle, Fibular   takedown osteotomy, left ankle, Arthrodesis of multiple midfoot joints, left   foot  Clinical Indicators: Hemoglobin: 11.1, 8.7, EBL: 300ml  Treatment: Serial labs  Options provided:  -- Postoperative acute blood loss anemia  -- Acute blood loss anemia  -- Other - I will add my own diagnosis  -- Disagree - Not applicable / Not valid  -- Disagree - Clinically unable to determine / Unknown  -- Refer to Clinical Documentation Reviewer    PROVIDER RESPONSE TEXT:    This patient has postoperative acute blood loss anemia.    Query created by: Zina Bhatia on 10/28/2024 1:29 PM      Electronically signed by:  Kristopher Combs DO 10/28/2024 2:53 PM          
  Progress Note  Podiatric Medicine and Surgery     Subjective     CC: Postoperative pain/ Facility placement   S/p talar fusion and medial column fusion, left ankle (DOS: 10/25/2024)     Interval:  - Patient seen and examined at bedside  - No acute events overnight  - Afebrile, vital signs stable   - Patient says pain is controlled at this time  - Denies consitutional symptoms  - Says she has worked with PT/OT today    HPI :  Shaila Souza is a 73 y.o. female who underwent a pantalar arthrodesis and medial column arthrodesis of her left foot and ankle by our service on 10/25/2024.  Patient underwent an extensive procedure and is being admitted for postoperative pain and monitoring.  Does not have an extensive past medical history.  In addition patient is set to discharge to an acute rehab facility for recovery.  Patient is in a posterior splint and is nonweightbearing to the operated extremity.  She did receive a popliteal block to help control her pain.  She will be evaluated and worked with PT/OT while in house.     ROS: Denies N/V/F/C/SOB/CP.  Otherwise negative except at stated in the HPI.     Medications:  Scheduled Meds:   famotidine  20 mg Oral BID    sodium chloride flush  5-40 mL IntraVENous 2 times per day    enoxaparin  40 mg SubCUTAneous Q24H    melatonin  3 mg Oral Nightly    ceFAZolin  2,000 mg IntraVENous Q8H    allopurinol  50 mg Oral Daily    amLODIPine  10 mg Oral Daily    cetirizine  10 mg Oral Daily    Vitamin D  500 Units Oral Daily    cloNIDine  1 patch TransDERmal Weekly    doxazosin  2 mg Oral Nightly    lisinopril  40 mg Oral Daily    magnesium oxide  400 mg Oral Daily    potassium chloride  10 mEq Oral Daily    pravastatin  80 mg Oral Daily       Continuous Infusions:   dextrose 5% in lactated ringers Stopped (10/25/24 0749)    sodium chloride         PRN Meds:aluminum & magnesium hydroxide-simethicone, sodium chloride flush, sodium chloride, potassium chloride **OR** potassium 
Alida pulled for trial.   
Block complete.  Side rails up x 2.  Call light within reach.  Pt remains on monitor and O2 2L NC.  
Occupational Therapy  Facility/Department: [facility] MED SURG  Rehabilitation Occupational Therapy Daily Treatment Note    Date: 10/28/24  Patient Name: Shaila Souza       Room:   MRN: [id number]  Account: [id number]   : 1950  (73 y.o.) Gender: female     Past Medical History:  has a past medical history of Arthritis, GERD (gastroesophageal reflux disease), History of blood transfusion, Hyperlipidemia, Hypertension, and Under care of team.  Past Surgical History:   has a past surgical history that includes Neck mass excision (Left); US BIOPSY THYROID; Tubal ligation; Total hip arthroplasty (Left); fracture surgery (Right); Excision basal cell carcinoma; Colonoscopy; and Ankle surgery (Left, 10/25/2024).    Restrictions  Restrictions/Precautions: General Precautions, Fall Risk, Weight Bearing, Surgical Protocols  Other position/activity restrictions: Up with assist, ext urinary catheter, RUE IV, s/p L ankle fusion, ALARMS  Right Lower Extremity Weight Bearing: Non Weight Bearing  Left Lower Extremity Weight Bearing: Non Weight Bearing  Required Braces or Orthoses  Left Lower Extremity Brace:  (posterior splint)  Required Braces or Orthoses?: Yes    Subjective  Subjective: Pt. supine in bed and agreeable for treatment.  Restrictions/Precautions: General Precautions;Fall Risk;Weight Bearing;Surgical Protocols  Objective     Cognition  Overall Cognitive Status: WFL  Arousal/Alertness: Appears intact  Following Commands: Appears intact  Attention Span: Appears intact  Memory: Appears intact  Safety Judgement: Decreased awareness of need for safety;Decreased awareness of need for assistance  Problem Solving: Decreased awareness of errors;Assistance required to correct errors made;Assistance required to identify errors made  Insights: Decreased awareness of deficits  Initiation: Requires cues for some  Sequencing: Does not require cues  Cognition Comment: Pt. does not initiate 
Occupational Therapy  Facility/Department: [facility] MED SURG  Occupational Therapy Initial Assessment    Name: Shaila Souza  : 1950  MRN: [id number]  Date of Service: 10/26/2024    Discharge Recommendations:  Patient would benefit from continued therapy after discharge Pt currently functioning below baseline.  Recommend daily inpatient skilled therapy at time of discharge to maximize long term outcomes and prevent re-admission. Please refer to AM-PAC score for current level of function.      HPI per chart: Shaila Souza is a 73 y.o. female who underwent a pantalar arthrodesis and medial column arthrodesis of her left foot and ankle by our service on 10/25/2024.  Patient underwent an extensive procedure and is being admitted for postoperative pain and monitoring.  Does not have an extensive past medical history.  In addition patient is set to discharge to an acute rehab facility for recovery.  Patient is in a posterior splint and is nonweightbearing to the operated extremity.  She did receive a popliteal block to help control her pain.  She will be evaluated and worked with PT/OT while in house.    Past Medical History:  has a past medical history of Arthritis, GERD (gastroesophageal reflux disease), History of blood transfusion, Hyperlipidemia, Hypertension, and Under care of team.  Past Surgical History:  has a past surgical history that includes Neck mass excision (Left); US BIOPSY THYROID; Tubal ligation; Total hip arthroplasty (Left); fracture surgery (Right); Excision basal cell carcinoma; and Colonoscopy.     RNs Kelsey and Franklin report patient is medically stable for therapy treatment this date. Chart reviewed prior to treatment and patient is agreeable for therapy.  All lines intact and patient positioned comfortably at end of treatment.  All patient needs addressed prior to ending therapy session.      Assessment  Performance deficits / Impairments: Decreased functional mobility ;Decreased ADL 
Patient arrived from the OR, oriented to room, made comfortable and given the call light.   VSS. Orders released. Bed locked and lowered, bed alarm on, side rails up x2. White board updated.   
Physical Therapy  Facility/Department: [facility]  Rehabilitation Physical Therapy Treatment Note    NAME: Shaila Souza  : 1950 (73 y.o.)  MRN: [id number]  CODE STATUS: Full Code    Date of Service: 10/28/24       Restrictions:  Restrictions/Precautions: General Precautions, Fall Risk, Weight Bearing, Surgical Protocols  Lower Extremity Weight Bearing Restrictions  Right Lower Extremity Weight Bearing: Non Weight Bearing  Left Lower Extremity Weight Bearing: Non Weight Bearing  Required Braces or Orthoses  Left Lower Extremity Brace:  (posterior splint)  Position Activity Restriction  Other position/activity restrictions: Up with assist, ext urinary catheter, RUE IV, s/p L ankle fusion, ALARMS     SUBJECTIVE  Subjective  Subjective: pt agreeable to PT RN ok's PT         OBJECTIVE  Cognition  Overall Cognitive Status: WFL  Arousal/Alertness: Appears intact  Following Commands: Appears intact  Attention Span: Appears intact  Memory: Appears intact  Safety Judgement: Decreased awareness of need for safety;Decreased awareness of need for assistance  Problem Solving: Decreased awareness of errors;Assistance required to correct errors made;Assistance required to identify errors made  Insights: Decreased awareness of deficits  Initiation: Requires cues for some  Sequencing: Does not require cues  Cognition Comment: Pt. does not initiate conversation  Orientation  Overall Orientation Status: Within Functional Limits    Functional Mobility  Bed Mobility  Overall Assistance Level: Contact Guard Assist  Roll Right  Assistance Level: Contact guard assist;Minimal assistance  Supine to Sit  Assistance Level: Contact guard assist  Scooting  Assistance Level: Contact guard assist  Transfers  Surface: From bed;To chair with arms  Additional Factors: Verbal cues;Hand placement cues  Device: Walker  Sit to Stand  Assistance Level: Minimal assistance;Requires x 2 assistance  Stand to Sit  Assistance Level: Minimal 
Physical Therapy  Facility/Department: [facility] SURG  Physical Therapy Initial Assessment    Name: Shaila Souza  : 1950  MRN: [id number]  Date of Service: 10/26/2024    Discharge Recommendations:  Patient would benefit from continued therapy after discharge, Therapy recommended at discharge        Pt presented to SURGERY on 10/25/24 for:  Pantalar arthrodesis, left ankle  Fibular takedown osteotomy, left ankle  Arthrodesis of multiple midfoot joints, left foot secondary Valgus deformity, not elsewhere classified, left ankle [M21.072], Ankle arthritis, nontraumatic, right lower extremity, and Pes planovalgus, right lower extremity      RN reports patient is medically stable for therapy treatment this date.    Chart reviewed prior to treatment and patient is agreeable for therapy.          Patient Diagnosis(es): Arthrodesis of multiple midfoot joints, left foot secondary Valgus deformity, not elsewhere classified, left ankle [M21.072]    Past Medical History:  has a past medical history of Arthritis, GERD (gastroesophageal reflux disease), History of blood transfusion, Hyperlipidemia, Hypertension, and Under care of team.  Past Surgical History:  has a past surgical history that includes Neck mass excision (Left); US BIOPSY THYROID; Tubal ligation; Total hip arthroplasty (Left); fracture surgery (Right); Excision basal cell carcinoma; and Colonoscopy.    Assessment  Body Structures, Functions, Activity Limitations Requiring Skilled Therapeutic Intervention: Decreased functional mobility ;Decreased ADL status;Decreased strength;Decreased safe awareness;Decreased cognition;Decreased endurance;Decreased balance;Decreased posture  Assessment: Pt with deficits of bed mobility, transfers, ambulation, balance, cognition, posture, safety awareness and endurance this session, & required 2 assist for safe transfers & gait with NWB on LLE. With current deficits, Pt INC risk for falls & requires continued PT to maximize 
[facility]  Office: [phone]  Josias Ramos DO, Mika Umanzor DO, Kristopher Combs DO, Efe Tatum DO, Olinda Joseph MD, Phuong Myrick MD, Rashel Dominguez MD, Ninfa Camacho MD,  Jorge Alvarado MD, Kate Garsia MD, Zachary Jones MD,  Jaime Solano DO, Gorge Nelson MD, Rm Baugh MD, Robert Ramos DO, Mary Portillo MD,  Neel Sin DO, Betty Ragsdale MD, Amber Barboza MD, Salma Shah MD, Romario Garcia MD,  Denny Sloan MD, Luis Felipe Samayoa MD, Mandi Burns MD, Caden Antoine MD, Austin Lopez MD, Tran Mckeon MD, Luacs Finney DO, Cordell Garcia MD, Shirley Waterhouse, CNP,  Melissa Tavera CNP, Lucas Tobin, EVY,  Laney Spaulding, MIKE, Chapis Brock, CNP, Naina Simons, CNP, Mercedes Meyers, CNP, Nedra Gonzalez, CNP, TENNILLE BainsC, TENNILLE SalcedoC, Jane Kim, EVY, Andi Zhang, CNP,  Carol Sanders, CNP, Haydee Vinson, CNP,  Bridget Mills, CNP, Carla Rea, CNP         [facility]   IN-PATIENT SERVICE   [facility]    Progress Note    10/28/2024    8:09 AM    Name:   Shaila Souza  MRN:     [id number]     Acct:      [id number]   Room:   2016/2016-02  IP Day:  3  Admit Date:  10/25/2024  5:45 AM    PCP:   Mercedes Johnson DO  Code Status:  Full Code    Subjective:     C/C: Ankle pain    Interval History Status: improved.     Patient is resting in bed and denies any complaints of chest pain, shortness of breath, nausea vomiting, fevers or chills or acute complaints.  Indigestion symptoms have markedly improved    Brief History:     This is a 73-year-old female that presents with left ankle pain and ankle arthritis for plantar arthrodesis, fibular takedown osteotomy as well as arthrodesis of multiple midfoot joints. She has had longstanding issues with arthritic complaints related to her ankle treated previously with steroid injections with decreasing effectiveness. At this time she is opted for surgical intervention 
Spiritual Health History and Assessment/Progress Note  [facility]    (P) Spiritual/Emotional Needs, Initial Encounter,  ,  ,      Name: Shaila Souza MRN: [id number]    Age: 73 y.o.     Sex: female   Language: English   Buddhism: Other   Post-op pain     Date: 10/28/2024            Total Time Calculated: (P) 10 min              Spiritual Assessment began in [facility] MED SURG        Referral/Consult From: (P) Rounding   Encounter Overview/Reason: (P) Spiritual/Emotional Needs, Initial Encounter  Service Provided For: (P) Patient    Cristina, Belief, Meaning:   Patient identifies as spiritual, is connected with a cristina tradition or spiritual practice, and has beliefs or practices that help with coping during difficult times  Family/Friends No family/friends present      Importance and Influence:  Patient has spiritual/personal beliefs that influence decisions regarding their health  Family/Friends No family/friends present    Community:  Patient is connected with a spiritual community and feels well-supported. Support system includes: Children, [place] Community, and Extended family  Family/Friends No family/friends present    Assessment and Plan of Care:     Patient Interventions include: Provided sacramental/Yazdanism ritual  Family/Friends Interventions include: No family/friends present    Patient Plan of Care: Spiritual Care available upon further referral  Family/Friends Plan of Care: Spiritual Care available upon further referral    Electronically signed by Chaplain Orquidea on 10/28/2024 at 1:26 PM       10/28/24 1324   Encounter Summary   Encounter Overview/Reason Spiritual/Emotional Needs;Initial Encounter   Service Provided For Patient   [facility]   Support System Children;Family members   Last Encounter  10/28/24   Complexity of Encounter Low   Begin Time 1310   End Time  1320   Total Time Calculated 10 min   Spiritual/Emotional needs   Type Spiritual Support 
[facility]  Office: [phone]  Josias Ramos DO, Mika Umanzor DO, Kristopher Combs DO, Efe Tatum DO, Olinda Joseph MD, Phuong Myrick MD, Rashel Dominguez MD, Ninfa Camacho MD,  Jorge Alvarado MD, Kate Garsia MD, Zachary Jones MD,  Jaime Solano DO, Gorge Nelson MD, Rm Baugh MD, Robert Ramos DO, Mary Portillo MD,  Neel Sin DO, Betty Ragsdale MD, Amber Barboaz MD, Salma Shah MD, Romario Garcia MD,  Denny Sloan MD, Luis Felipe Samayoa MD, Mandi Burns MD, Caden Antoine MD, Austin Lopez MD, Tran Mckeon MD, Lucas Finney DO, Cordell Garcia MD, Shirley Waterhouse, CNP,  Melissa Tavera CNP, Lucas Tobin, EVY,  Laney Spaulding, MIKE, Chapis Brock, CNP, Naina Simons, CNP, Mercedes Meyers, CNP, Nedra Gonzalez, CNP, TENNILLE BainsC, TENNILLE SalcedoC, Jane Kim, CNP, Andi Zhang, CNP,  Carol Sanders, CNP, Haydee Vinson, CNP,  Bridget Mills, CNP, Carla Rea, CNP         [facility]   IN-PATIENT SERVICE   [facility]    Progress Note    10/26/2024    9:36 AM    Name:   Shaila Souza  MRN:     [id number]     Acct:      [id number]   Room:   2016/2016-02  IP Day:  1  Admit Date:  10/25/2024  5:45 AM    PCP:   Mercedes Johnson DO  Code Status:  Full Code    Subjective:     C/C: Ankle pain    Interval History Status: improved.     Patient does report therapy this morning, currently up to chair.  Slightly hypotensive with therapy but no dizziness.  Denies any chest pain, shortness of breath, nausea vomiting, fevers or chills.  Has had heartburn all night without relief with Tums.    Brief History:     This is a 73-year-old female that presents with left ankle pain and ankle arthritis for plantar arthrodesis, fibular takedown osteotomy as well as arthrodesis of multiple midfoot joints. She has had longstanding issues with arthritic complaints related to her ankle treated previously with steroid injections with decreasing 
[facility]  Office: [phone]  Josias Ramos DO, Mika Umanzor DO, Kristopher Combs DO, Efe Tatum DO, Olinda Joseph MD, Phuong Myrick MD, Rashel Dominguez MD, Ninfa Camacho MD,  Jorge Alvarado MD, Kate Garsia MD, Zachary Jones MD,  Jaime Solano DO, Gorge Nelson MD, Rm Baugh MD, Robert Ramos DO, Mary Portillo MD,  Neel Sin DO, Betty Ragsdale MD, Amber Barboza MD, Salma Shah MD, Romario Garcia MD,  Denny Sloan MD, Luis Felipe Samayoa MD, Mandi Burns MD, Caden Antoine MD, Austin Lopez MD, Tran Mckeon MD, Lucas Finney DO, Cordell Garcia MD, Shirley Waterhouse, CNP,  Melissa Tavera CNP, Lucas Tobin, CNP,  Laney Spaulding, MIKE, Chapis Brock, CNP, Naina Simons, CNP, Mercedes Meyers, CNP, Nedra Gonzalez, CNP, TENNILLE BainsC, TENNILLE SalcedoC, Jane Kim, CNP, Andi Zhang, CNP,  Carol Sanders, CNP, Haydee Vinson, CNP,  Bridget Mills, CNP, Carla Rea, CNP         [facility]   IN-PATIENT SERVICE   [facility]    Progress Note    10/27/2024    9:46 AM    Name:   Shaila Souza  MRN:     [id number]     Acct:      [id number]   Room:   2016/2016-02  IP Day:  2  Admit Date:  10/25/2024  5:45 AM    PCP:   Mercedes Johnson DO  Code Status:  Full Code    Subjective:     C/C: Ankle pain    Interval History Status: improved.     Patient with continued dyspepsia/indigestion symptoms.  Denies any shortness of breath, nausea or vomiting, fevers chills or acute complaints.  Had sensation of food bolus impaction earlier today which is now resolved.    Brief History:     This is a 73-year-old female that presents with left ankle pain and ankle arthritis for plantar arthrodesis, fibular takedown osteotomy as well as arthrodesis of multiple midfoot joints. She has had longstanding issues with arthritic complaints related to her ankle treated previously with steroid injections with decreasing effectiveness. At this time she is 
Transitions of Care Pharmacy Service   Medication Review    The patient's list of current home medications has been reviewed.     Source(s) of information: Patient; [facility]; [facility]    Based on information provided by the above source(s), I have updated the patient's home med list as described below.     Please review the ACTION REQUESTED section of this note below for any discrepancies on current hospital orders.      I changed or updated the following medications on the patient's home medication list:  Removed None     Added None     Adjusted   Lisinopril   Other Notes None             Please feel free to call me with any questions about this encounter. Thank you.    Brianna Hayden [facility]   Transitions of Care Pharmacy Service  Phone:  [phone]  Fax: [phone]      Electronically signed by Brianna Hayden RP on 10/26/2024 at 5:10 PM       Prior to Admission medications    Medication Sig Start Date End Date Taking? Authorizing Provider   alendronate (FOSAMAX) 70 MG tablet Take 1 tablet by mouth every 7 days Fridays 4/8/24  Yes Merle Oconnell MD   allopurinol (ZYLOPRIM) 100 MG tablet Take 0.5 tablets by mouth daily 10/16/23  Yes Merle Oconnell MD   amLODIPine (NORVASC) 10 MG tablet Take 1 tablet by mouth daily 5/15/24  Yes Merle Oconnell MD   Biotin 10 MG CAPS Take 1 capsule by mouth daily   Yes Merle Oconnell MD   cloNIDine (CATAPRES) 0.2 MG/24HR PTWK Place 1 patch onto the skin once a week Fridays 4/22/24  Yes Merle Oconnell MD   cyclobenzaprine (FLEXERIL) 10 MG tablet Take 1 tablet by mouth 3 times daily as needed 1/30/24  Yes Merle Oconnell MD   doxazosin (CARDURA) 2 MG tablet Take 1 tablet by mouth nightly 8/30/24  Yes Merle Oconnell MD   pravastatin (PRAVACHOL) 80 MG tablet Take 1 tablet by mouth daily   Yes Merle Oconnell MD   lisinopril (PRINIVIL;ZESTRIL) 40 MG tablet Take 1 tablet by mouth daily 1/30/24  Yes Anish 
Transfer training; ADL/Self-care training; Endurance training; Gait training; Home exercise program;Safety education & training;Patient/Caregiver education & training;Positioning; Therapeutic activities;Stair training    Restrictions  Restrictions/Precautions  Restrictions/Precautions: General Precautions, Fall Risk, Weight Bearing, Surgical Protocols  Required Braces or Orthoses?: Yes  Lower Extremity Weight Bearing Restrictions  Right Lower Extremity Weight Bearing: Weight Bearing  Left Lower Extremity Weight Bearing: Non Weight Bearing  Required Braces or Orthoses  Left Lower Extremity Brace:  (posterior splint)  Position Activity Restriction  Other position/activity restrictions: Up with assist, ext urinary catheter, RUE IV, s/p L ankle fusion, ALARMS     Subjective   Subjective  Subjective: Patient in bed upon arrival; WALTER Reid reports patient appropiate for therapy.  Orientation  Overall Orientation Status: Within Functional Limits    Cognition  Overall Cognitive Status: WFL  Arousal/Alertness: Appears intact  Following Commands: Appears intact  Attention Span: Appears intact  Memory: Appears intact  Safety Judgement: Decreased awareness of need for safety;Decreased awareness of need for assistance  Insights: Decreased awareness of deficits    Objective  Vitals     Bed Mobility Training  Bed Mobility Training: Yes  Overall Level of Assistance: Contact-guard assistance  Rolling: Contact-guard assistance  Supine to Sit: Contact-guard assistance (With use of Handrail); increased time and effort     Transfer Training  Transfer Training: Yes  Overall Level of Assistance: Minimum assistance  Sit to Stand: Minimum assistance from EOB with use of RW   Stand to Sit: Minimum assistance  Stand Pivot Transfers: Minimum assistance  Bed to Chair: Minimum assistance     PT Exercises  Exercise Treatment: Supine exercises x10  Static Standing Balance Exercises: Patient stood at RW 2 min with emphasis on maintaining NWB on 
chloride, magnesium sulfate, ondansetron **OR** ondansetron, polyethylene glycol, acetaminophen **OR** acetaminophen, HYDROmorphone, oxyCODONE-acetaminophen, cyclobenzaprine, senna, bisacodyl, metoprolol    Objective     Vitals:  Patient Vitals for the past 8 hrs:   Resp   10/27/24 1233 18     Average, Min, and Max for last 24 hours Vitals:  TEMPERATURE:  Temp  Av.7 °F (37.1 °C)  Min: 97.9 °F (36.6 °C)  Max: 99.5 °F (37.5 °C)    RESPIRATIONS RANGE: Resp  Av.1  Min: 16  Max: 20    PULSE RANGE: Pulse  Av.4  Min: 92  Max: 100    BLOOD PRESSURE RANGE:  Systolic (24hrs), Av , Min:139 , Max:178   ; Diastolic (24hrs), Av, Min:64, Max:73      PULSE OXIMETRY RANGE: SpO2  Av.8 %  Min: 93 %  Max: 98 %    I/O last 3 completed shifts:  In: 400 [P.O.:400]  Out: 2000 [Urine:2000]    CBC:  Recent Labs     10/25/24  1646 10/26/24  1035 10/27/24  0616   WBC  --  15.4* 14.1*   HGB 11.1* 10.6* 8.9*   HCT 35.7* 31.1* 26.7*   PLT  --  223 197        BMP:  Recent Labs     10/26/24  1035 10/27/24  0616   * 131*   K 4.3 4.5   CL 97* 97*   CO2 23 25   BUN 16 11   CREATININE 0.9 0.9   GLUCOSE 156* 110*   CALCIUM 9.6 8.4*        Coags:  No results for input(s): \"APTT\", \"INR\" in the last 72 hours.    Invalid input(s): \"PROT\"    No results found for: \"SEDRATE\"  No results for input(s): \"CRP\" in the last 72 hours.      Physical Exam:     General: A&Ox3, NAD  Heart: Regular rate and rhythm.   Lungs: Equal air entry.  No increased effort.   Abdomen: Soft, non-tender to palpation. Not distended.     Left lower Extremity Physical Exam:     Exam limited due to intact posterior splint to the left lower extremity.    Vascular: DP and PT pulses are palpable +2/4. CFT <3 seconds to all digits. Dopplarable pulses. Hair growth is present to the level of the digits.  Moderate edema.       Neuro: Saph/sural/SP/DP/plantar sensation intact to light touch.     Musculoskeletal: Muscle strength is deferred to all lower extremity 
limited due to intact posterior splint to the left lower extremity.  Dressings left intact  Vascular: DP and PT pulses are palpable +2/4. CFT <3 seconds to all digits. Dopplarable pulses. Hair growth is present to the level of the digits.  Moderate edema.       Neuro: Saph/sural/SP/DP/plantar sensation intact to light touch.     Musculoskeletal: Muscle strength is deferred to all lower extremity muscle groups. Gross deformity is absent.  Patient presented with pes planovalgus and loss of medial longitudinal arch.  This was restored during surgery, recreation of the arch and neutral alignment of the.     Dermatologic: Patient has multiple incisions along the lower extremity including a lateral incision over the ankle and medial incision over the arch, small incision on the plantar heel and posterior heel as well as an anterior medial incision on the lower leg.  All are coapted with sutures and staples.    Patient able to wiggle toes.  CFT less than 3 seconds.  Light sensation to digits is intact      Imaging:   XR ANKLE LEFT (MIN 3 VIEWS)   Final Result   Postsurgical changes of the left lower leg as detailed above.         XR FOOT LEFT (MIN 3 VIEWS)   Final Result   Postsurgical changes of the left lower leg as detailed above.         XR CALCANEUS LEFT (MIN 2 VIEWS)   Final Result   Postsurgical changes of the left lower leg as detailed above.         XR TIBIA FIBULA LEFT (2 VIEWS)   Final Result   Postsurgical changes of the left lower leg as detailed above.         FLUORO FOR SURGICAL PROCEDURES   Final Result          Assessment   Shaila Souza is a 73 y.o. female with   S/p talar fusion and medial column fusion, left ankle (DOS: 10/25/2024)  Postoperative pain    Principal Problem:    Post-op pain  Active Problems:    Dyslipidemia    Primary hypertension    Dyspepsia  Resolved Problems:    * No resolved hospital problems. *       Plan     Patient examined and evaluated at bedside.   Treatment options

## 2024-10-31 LAB
EKG ATRIAL RATE: 98 BPM
EKG P AXIS: 56 DEGREES
EKG P-R INTERVAL: 160 MS
EKG Q-T INTERVAL: 326 MS
EKG QRS DURATION: 102 MS
EKG QTC CALCULATION (BAZETT): 416 MS
EKG R AXIS: 26 DEGREES
EKG T AXIS: 177 DEGREES
EKG VENTRICULAR RATE: 98 BPM

## (undated) DEVICE — SUTURE VICRYL + ABSORBABLE BRAIDED 2-0 FS1 27 IN WHT  VCP443H

## (undated) DEVICE — GAUZE,SPONGE,FLUFF,6"X6.75",STRL,5/TRAY: Brand: MEDLINE

## (undated) DEVICE — SOLUTION IRRIG 500ML 0.9% SOD CHLO USP POUR PLAS BTL

## (undated) DEVICE — PADDING CAST W6INXL4YD ST COT RAYON MICROPLEATED HIGHLY

## (undated) DEVICE — YANKAUER,FLEXIBLE HANDLE,REGLR CAPACITY: Brand: MEDLINE INDUSTRIES, INC.

## (undated) DEVICE — C-ARM: Brand: UNBRANDED

## (undated) DEVICE — THIN OFFSET (13.3 X 0.38 X 42.0MM)

## (undated) DEVICE — GLOVE SURG SZ 65 CRM LTX FREE POLYISOPRENE POLYMER BEAD ANTI

## (undated) DEVICE — STEPPED REAMER: Brand: PHANTOM HINDFOOT TTC/TC NAIL SYSTEM

## (undated) DEVICE — AGENT HEMSTAT SZ 50 W8XL6.25CM THK10MM PORCINE GEL ABSRB

## (undated) DEVICE — PRECISION THIN (9.0 X 0.38 X 25.0MM)

## (undated) DEVICE — NEPTUNE E-SEP SMOKE EVACUATION PENCIL, COATED, 70MM BLADE, PUSH BUTTON SWITCH: Brand: NEPTUNE E-SEP

## (undated) DEVICE — 4.0MM EGG RESURFACING TOOL

## (undated) DEVICE — GOWN,SIRUS,NONRNF,SETINSLV,XL,20/CS: Brand: MEDLINE

## (undated) DEVICE — K-WIRE, SINGLE ENDED TROCAR TIP, SMOOTH, 2.3 X 230MM
Type: IMPLANTABLE DEVICE | Site: FOOT | Status: NON-FUNCTIONAL
Brand: MONSTER SCREW SYSTEM
Removed: 2024-10-25

## (undated) DEVICE — DRILL, 4.6 X 300MM, SOLID, TROCAR TIP, 3/16" SQ. CONNECTION: Brand: PHANTOM HINDFOOT TTC/TC NAIL SYSTEM

## (undated) DEVICE — SUTURE NONABSORBABLE MONOFILAMENT 3-0 PS-1 18 IN BLK ETHILON 1663H

## (undated) DEVICE — PHANTOM NAIL, CALCANEUS SCREW, Ø7.2MM X 60MM LENGTH, HEADLESS
Type: IMPLANTABLE DEVICE | Site: FOOT | Status: NON-FUNCTIONAL
Brand: PHANTOM HINDFOOT TTC/TC NAIL SYSTEM
Removed: 2024-10-25

## (undated) DEVICE — 4-PORT MANIFOLD: Brand: NEPTUNE 2

## (undated) DEVICE — COVER LT HNDL BLU PLAS

## (undated) DEVICE — OLIVE WIRE, SMOOTH, 1.4MM: Brand: BABY GORILLA/GORILLA PLATING SYSTEM

## (undated) DEVICE — SKIN PREP TRAY W/CHG: Brand: MEDLINE INDUSTRIES, INC.

## (undated) DEVICE — STAZ LOWER EXTREMITY: Brand: MEDLINE INDUSTRIES, INC.

## (undated) DEVICE — DRILL, 3.8 X 250MM DRILL, SOLID, TROCAR TIP, 3/16" SQ. CONNECTION: Brand: PHANTOM HINDFOOT TTC/TC NAIL SYSTEM

## (undated) DEVICE — DRAPE,REIN 53X77,STERILE: Brand: MEDLINE

## (undated) DEVICE — SHANNON-STYLE RECTA BONE BURR, 2.2 X 20 X 72, AO (STERILE): Brand: FJ2000 SYSTEM

## (undated) DEVICE — SUTURE VICRYL SZ 3-0 L54IN ABSRB UD LIGAPAK REEL POLYGLACTIN J285G

## (undated) DEVICE — SPONGE LAP W18XL18IN WHT COT 4 PLY FLD STRUNG RADPQ DISP ST 2 PER PACK

## (undated) DEVICE — Device

## (undated) DEVICE — 2.0MM X 20CM K-WIRE
Type: IMPLANTABLE DEVICE | Site: FOOT | Status: NON-FUNCTIONAL
Brand: BABY GORILLA/GORILLA PLATING SYSTEM
Removed: 2024-10-25

## (undated) DEVICE — PHANTOM HINDFOOT BALL TIPPED GUIDE ROD, 3.0 X 550MM
Type: IMPLANTABLE DEVICE | Site: FOOT | Status: NON-FUNCTIONAL
Brand: PHANTOM HINDFOOT TTC/TC NAIL SYSTEM
Removed: 2024-10-25

## (undated) DEVICE — BNDG,ELSTC,MATRIX,STRL,6"X5YD,LF,HOOK&LP: Brand: MEDLINE

## (undated) DEVICE — GLOVE SURG SZ 7 CRM LTX FREE POLYISOPRENE POLYMER BEAD ANTI

## (undated) DEVICE — P28, K-WIRE, 1.6 X 150 MM, SINGLE TROCAR, SMOOTH, SS
Type: IMPLANTABLE DEVICE | Site: FOOT | Status: NON-FUNCTIONAL
Brand: MULTI SYSTEM
Removed: 2024-10-25

## (undated) DEVICE — K-WIRE, SINGLE ENDED TROCAR TIP, SMOOTH, 2.3 X 300MM
Type: IMPLANTABLE DEVICE | Site: FOOT | Status: NON-FUNCTIONAL
Brand: MONSTER SCREW SYSTEM
Removed: 2024-10-25

## (undated) DEVICE — GAUZE,SPONGE,4"X4",16PLY,XRAY,STRL,LF: Brand: MEDLINE

## (undated) DEVICE — BANDAGE COBAN 4 IN COMPR W4INXL5YD FOAM COHESIVE QUIK STK SELF ADH SFT

## (undated) DEVICE — FJ-2000, MIS HAND PIECE AND AO CONNECTION KIT (STERILE): Brand: FJ2000 SYSTEM

## (undated) DEVICE — DRESSING TRNSPAR W5XL4.5IN FLM SHT SEMIPERMEABLE WIND

## (undated) DEVICE — C-ARMOR C-ARM EQUIPMENT COVERS CLEAR STERILE UNIVERSAL FIT 12 PER CASE: Brand: C-ARMOR

## (undated) DEVICE — DEVICE SUTURE ADH RETENTION RIGID HEMI BRIDGE LAYR

## (undated) DEVICE — SUTURE PROL SZ 2-0 L18IN NONABSORBABLE BLU FS L26MM 3/8 CIR 8685H

## (undated) DEVICE — PAD,ABDOMINAL,5"X9",ST,LF,25/BX: Brand: MEDLINE INDUSTRIES, INC.

## (undated) DEVICE — PRECISION® GUIDE DRILL PIN, 3.0 X 320MM: Brand: PHANTOM HINDFOOT TTC/TC NAIL SYSTEM

## (undated) DEVICE — DRILL,  2.4 X 140MM, SOLID, MEASURING, LONG, AO: Brand: BABY GORILLA®/GORILLA® PLATING SYSTEM

## (undated) DEVICE — TOWEL,OR,DSP,ST,BLUE,DLX,XR,4/PK,20PK/CS: Brand: MEDLINE

## (undated) DEVICE — DRILL, 3.5 X 160MM, CANNULATED, AO: Brand: MONSTER® SCREW SYSTEM

## (undated) DEVICE — 7.0 X 20CM DRILL: Brand: PHANTOM HINDFOOT TTC/TC NAIL SYSTEM

## (undated) DEVICE — BONE FENESTRATION PERFORATOR: Brand: PHANTOM HINDFOOT TTC/TC NAIL SYSTEM